# Patient Record
Sex: FEMALE | Race: WHITE | NOT HISPANIC OR LATINO | URBAN - METROPOLITAN AREA
[De-identification: names, ages, dates, MRNs, and addresses within clinical notes are randomized per-mention and may not be internally consistent; named-entity substitution may affect disease eponyms.]

---

## 2019-05-31 ENCOUNTER — APPOINTMENT (OUTPATIENT)
Dept: URGENT CARE | Age: 79
End: 2019-05-31

## 2025-04-01 RX ORDER — EZETIMIBE AND SIMVASTATIN 10; 40 MG/1; MG/1
1 TABLET ORAL EVERY EVENING
COMMUNITY

## 2025-04-01 RX ORDER — MELATONIN
1000 EVERY OTHER DAY
COMMUNITY

## 2025-04-01 RX ORDER — IBUPROFEN 200 MG
200 TABLET ORAL EVERY 6 HOURS PRN
COMMUNITY
End: 2025-04-17

## 2025-04-01 RX ORDER — ACETAMINOPHEN 500 MG
500 TABLET ORAL EVERY 6 HOURS PRN
COMMUNITY
End: 2025-04-17

## 2025-04-02 ENCOUNTER — TELEPHONE (OUTPATIENT)
Dept: FAMILY MEDICINE CLINIC | Facility: CLINIC | Age: 85
End: 2025-04-02

## 2025-04-04 ENCOUNTER — LAB ENCOUNTER (OUTPATIENT)
Dept: LAB | Facility: HOSPITAL | Age: 85
End: 2025-04-04
Attending: FAMILY MEDICINE
Payer: MEDICARE

## 2025-04-04 ENCOUNTER — OFFICE VISIT (OUTPATIENT)
Dept: FAMILY MEDICINE CLINIC | Facility: CLINIC | Age: 85
End: 2025-04-04
Payer: MEDICARE

## 2025-04-04 VITALS
BODY MASS INDEX: 26 KG/M2 | OXYGEN SATURATION: 98 % | TEMPERATURE: 98 F | DIASTOLIC BLOOD PRESSURE: 82 MMHG | RESPIRATION RATE: 16 BRPM | SYSTOLIC BLOOD PRESSURE: 142 MMHG | WEIGHT: 159 LBS | HEART RATE: 83 BPM

## 2025-04-04 DIAGNOSIS — M47.812 CERVICAL SPONDYLOSIS: ICD-10-CM

## 2025-04-04 DIAGNOSIS — I83.93 ASYMPTOMATIC VARICOSE VEINS OF BOTH LOWER EXTREMITIES: ICD-10-CM

## 2025-04-04 DIAGNOSIS — Z86.79 HISTORY OF ASCVD (ARTERIOSCLEROTIC CARDIOVASCULAR DISEASE): ICD-10-CM

## 2025-04-04 DIAGNOSIS — E53.8 VITAMIN B 12 DEFICIENCY: ICD-10-CM

## 2025-04-04 DIAGNOSIS — M47.816 LUMBAR SPONDYLOSIS: ICD-10-CM

## 2025-04-04 DIAGNOSIS — M16.11 PRIMARY OSTEOARTHRITIS OF RIGHT HIP: Primary | ICD-10-CM

## 2025-04-04 DIAGNOSIS — I10 PRIMARY HYPERTENSION: ICD-10-CM

## 2025-04-04 DIAGNOSIS — E78.2 MIXED HYPERLIPIDEMIA: ICD-10-CM

## 2025-04-04 DIAGNOSIS — E55.9 VITAMIN D DEFICIENCY: ICD-10-CM

## 2025-04-04 DIAGNOSIS — Z87.09 HISTORY OF ASTHMA: ICD-10-CM

## 2025-04-04 DIAGNOSIS — Z01.812 ENCOUNTER FOR PRE-OPERATIVE LABORATORY TESTING: ICD-10-CM

## 2025-04-04 DIAGNOSIS — M15.0 PRIMARY OSTEOARTHRITIS INVOLVING MULTIPLE JOINTS: ICD-10-CM

## 2025-04-04 LAB
ALBUMIN SERPL-MCNC: 4.3 G/DL (ref 3.2–4.8)
ALBUMIN/GLOB SERPL: 1.7 {RATIO} (ref 1–2)
ALP LIVER SERPL-CCNC: 59 U/L
ALT SERPL-CCNC: 21 U/L
ANION GAP SERPL CALC-SCNC: 6 MMOL/L (ref 0–18)
ANTIBODY SCREEN: NEGATIVE
AST SERPL-CCNC: 23 U/L (ref ?–34)
ATRIAL RATE: 58 BPM
BASOPHILS # BLD AUTO: 0.04 X10(3) UL (ref 0–0.2)
BASOPHILS NFR BLD AUTO: 0.6 %
BILIRUB SERPL-MCNC: 0.9 MG/DL (ref 0.2–1.1)
BILIRUB UR QL: NEGATIVE
BUN BLD-MCNC: 13 MG/DL (ref 9–23)
BUN/CREAT SERPL: 17.8 (ref 10–20)
CALCIUM BLD-MCNC: 9.1 MG/DL (ref 8.7–10.4)
CHLORIDE SERPL-SCNC: 99 MMOL/L (ref 98–112)
CLARITY UR: CLEAR
CO2 SERPL-SCNC: 30 MMOL/L (ref 21–32)
CREAT BLD-MCNC: 0.73 MG/DL
DEPRECATED RDW RBC AUTO: 37.8 FL (ref 35.1–46.3)
EGFRCR SERPLBLD CKD-EPI 2021: 82 ML/MIN/1.73M2 (ref 60–?)
EOSINOPHIL # BLD AUTO: 0.11 X10(3) UL (ref 0–0.7)
EOSINOPHIL NFR BLD AUTO: 1.5 %
ERYTHROCYTE [DISTWIDTH] IN BLOOD BY AUTOMATED COUNT: 11.7 % (ref 11–15)
FASTING STATUS PATIENT QL REPORTED: YES
GLOBULIN PLAS-MCNC: 2.5 G/DL (ref 2–3.5)
GLUCOSE BLD-MCNC: 98 MG/DL (ref 70–99)
GLUCOSE UR-MCNC: NORMAL MG/DL
HCT VFR BLD AUTO: 43.1 %
HGB BLD-MCNC: 15.3 G/DL
HGB UR QL STRIP.AUTO: NEGATIVE
IMM GRANULOCYTES # BLD AUTO: 0.03 X10(3) UL (ref 0–1)
IMM GRANULOCYTES NFR BLD: 0.4 %
KETONES UR-MCNC: NEGATIVE MG/DL
LEUKOCYTE ESTERASE UR QL STRIP.AUTO: 25
LYMPHOCYTES # BLD AUTO: 1.82 X10(3) UL (ref 1–4)
LYMPHOCYTES NFR BLD AUTO: 25.1 %
MCH RBC QN AUTO: 31.8 PG (ref 26–34)
MCHC RBC AUTO-ENTMCNC: 35.5 G/DL (ref 31–37)
MCV RBC AUTO: 89.6 FL
MONOCYTES # BLD AUTO: 0.55 X10(3) UL (ref 0.1–1)
MONOCYTES NFR BLD AUTO: 7.6 %
NEUTROPHILS # BLD AUTO: 4.69 X10 (3) UL (ref 1.5–7.7)
NEUTROPHILS # BLD AUTO: 4.69 X10(3) UL (ref 1.5–7.7)
NEUTROPHILS NFR BLD AUTO: 64.8 %
NITRITE UR QL STRIP.AUTO: NEGATIVE
OSMOLALITY SERPL CALC.SUM OF ELEC: 280 MOSM/KG (ref 275–295)
P AXIS: 70 DEGREES
P-R INTERVAL: 192 MS
PH UR: 7 [PH] (ref 5–8)
PLATELET # BLD AUTO: 269 10(3)UL (ref 150–450)
POTASSIUM SERPL-SCNC: 4.2 MMOL/L (ref 3.5–5.1)
PROT SERPL-MCNC: 6.8 G/DL (ref 5.7–8.2)
PROT UR-MCNC: NEGATIVE MG/DL
Q-T INTERVAL: 420 MS
QRS DURATION: 82 MS
QTC CALCULATION (BEZET): 412 MS
R AXIS: -3 DEGREES
RBC # BLD AUTO: 4.81 X10(6)UL
RH BLOOD TYPE: POSITIVE
SODIUM SERPL-SCNC: 135 MMOL/L (ref 136–145)
SP GR UR STRIP: 1.01 (ref 1–1.03)
T AXIS: 27 DEGREES
UROBILINOGEN UR STRIP-ACNC: NORMAL
VENTRICULAR RATE: 58 BPM
WBC # BLD AUTO: 7.2 X10(3) UL (ref 4–11)

## 2025-04-04 PROCEDURE — 87077 CULTURE AEROBIC IDENTIFY: CPT

## 2025-04-04 PROCEDURE — 86850 RBC ANTIBODY SCREEN: CPT | Performed by: FAMILY MEDICINE

## 2025-04-04 PROCEDURE — 99214 OFFICE O/P EST MOD 30 MIN: CPT | Performed by: FAMILY MEDICINE

## 2025-04-04 PROCEDURE — 1159F MED LIST DOCD IN RCRD: CPT | Performed by: FAMILY MEDICINE

## 2025-04-04 PROCEDURE — 93005 ELECTROCARDIOGRAM TRACING: CPT

## 2025-04-04 PROCEDURE — 1125F AMNT PAIN NOTED PAIN PRSNT: CPT | Performed by: FAMILY MEDICINE

## 2025-04-04 PROCEDURE — 36415 COLL VENOUS BLD VENIPUNCTURE: CPT

## 2025-04-04 PROCEDURE — 1160F RVW MEDS BY RX/DR IN RCRD: CPT | Performed by: FAMILY MEDICINE

## 2025-04-04 PROCEDURE — 87186 SC STD MICRODIL/AGAR DIL: CPT

## 2025-04-04 PROCEDURE — 87081 CULTURE SCREEN ONLY: CPT

## 2025-04-04 PROCEDURE — 86901 BLOOD TYPING SEROLOGIC RH(D): CPT | Performed by: FAMILY MEDICINE

## 2025-04-04 PROCEDURE — 81001 URINALYSIS AUTO W/SCOPE: CPT

## 2025-04-04 PROCEDURE — 87086 URINE CULTURE/COLONY COUNT: CPT

## 2025-04-04 PROCEDURE — 93010 ELECTROCARDIOGRAM REPORT: CPT | Performed by: INTERNAL MEDICINE

## 2025-04-04 PROCEDURE — 80053 COMPREHEN METABOLIC PANEL: CPT

## 2025-04-04 PROCEDURE — 86900 BLOOD TYPING SEROLOGIC ABO: CPT | Performed by: FAMILY MEDICINE

## 2025-04-04 PROCEDURE — 85025 COMPLETE CBC W/AUTO DIFF WBC: CPT

## 2025-04-04 NOTE — H&P
[facility] PRE-OP CLINIC [place]    PRE-OP NOTE    HPI:   I have been consulted by Dr. Muñoz to see Mary Oneill 84 year old female for a preoperative evaluation and medical clearance. She has a long history of worsening severe degenerative arthritis of her right hip . Patient is to have a right total hip arthroplasty  by Dr. Muñoz  on 4/15/2025.     Pt suffers significant pain and loss of function.     She has no cardiopulmonary symptoms.      She has no history of JORGE or DVT. Denies tobacco use.       Family History   Problem Relation Age of Onset    Diabetes Father     Obesity Father     Lipids Father     Heart Disorder Father     Cancer Mother         breast    Diabetes Sister     Diabetes Brother     Cancer Maternal Aunt       Current Outpatient Medications   Medication Sig Dispense Refill    ezetimibe-simvastatin 10-40 MG Oral Tab Take 1 tablet by mouth every evening.      Cholecalciferol (VITAMIN D-3 OR) Take 2,000 Int'l Units/day by mouth daily.      cyanocobalamin 1000 MCG Oral Tab Take 1 tablet (1,000 mcg total) by mouth every other day.      acetaminophen 500 MG Oral Tab Take 1 tablet (500 mg total) by mouth every 6 (six) hours as needed for Pain.      ibuprofen (ADVIL) 200 MG Oral Tab Take 1 tablet (200 mg total) by mouth every 6 (six) hours as needed for Pain.       Past Medical History:    Cataract    High cholesterol    Osteoarthritis    UTI (urinary tract infection)    Varicose vein of leg    bilateral    Visual impairment    glasses     Past Surgical History:   Procedure Laterality Date    Colonoscopy      Foot surgery Bilateral 2022    Buniectomy    Other surgical history      bilat leg varicose vein procedure    Total knee replacement Right 2012    Tubal ligation       Social History     Socioeconomic History    Marital status: Not on file     Spouse name: Not on file    Number of children: Not on file    Years of education: Not on file    Highest education level: Not on file   Occupational  History    Not on file   Tobacco Use    Smoking status: Never    Smokeless tobacco: Never   Vaping Use    Vaping status: Never Used   Substance and Sexual Activity    Alcohol use: Not Currently     Comment: rarely    Drug use: Never    Sexual activity: Not on file   Other Topics Concern    Not on file   Social History Narrative    Not on file     Social Drivers of Health     Food Insecurity: Not on file   Transportation Needs: Not on file   Stress: Not on file   Housing Stability: Not on file       REVIEW OF SYSTEMS:   CONSTITUTIONAL:  Denies unusual weight gain/loss, fever, chills  EENT:  Eyes:  Denies eye pain, visual loss, blurred vision, double vision. Ears, Nose, Throat:  Denies congestion, runny nose or sore throat.  INTEGUMENTARY:  Denies rashes, itching, skin lesion,   CARDIOVASCULAR:  Denies DVT. Denies chest pain, palpitations, edema, dyspnea  RESPIRATORY: Denies  JORGE ,Denies shortness of breath, wheezing, cough  GASTROINTESTINAL:  Denies abdominal pain, nausea, vomiting, constipation, diarrhea, or blood in stool.  MUSCULOSKELETAL: severe pain to her right hip as noted above  NEUROLOGICAL:  Denies headache, seizures, dizziness, syncope, paralysis, ataxia,  HEMATOLOGIC:  Denies anemia, bleeding or bruising.  LYMPHATICS:  Denies enlarged nodes   PSYCHIATRIC:  Denies depression or anxiety.  ENDOCRINOLOGIC: DM 2 NO,   ALLERGIES:  Denies allergic response, history of asthma, hives,     EXAM:   /82 (BP Location: Left arm, Patient Position: Sitting)   Pulse 83   Temp 98.2 °F (36.8 °C) (Temporal)   Resp 16   Wt 159 lb (72.1 kg)   SpO2 98%   BMI 26.46 kg/m²  Estimated body mass index is 26.46 kg/m² as calculated from the following:    Height as of 4/1/25: 5' 5\" (1.651 m).    Weight as of this encounter: 159 lb (72.1 kg).   Vital signs reviewed.Appears stated age, well groomed.  Physical Exam:  GEN:  Patient is alert, awake and oriented, well developed, well nourished, no apparent distress.  HEENT:   Head:  Normocephalic, atraumatic  Nose: patent, no nasal discharge  NECK: Supple, no CLAD, no carotid bruit, no thyromegaly.  SKIN: No rashes, no skin lesion, no bruising, good turgor.  HEART:  Regular rate and rhythm, no murmurs, rubs or gallops.  LUNGS: Clear to auscultation bilterally, no rales/rhonchi/wheezing.  CHEST: No tenderness.  ABDOMEN:  Soft, nondistended, nontender,  no masses, no hepatosplenomegaly.  BACK: No tenderness, no spasm,   EXTREMITIES:  restricted ROM to her right hip ,   NEURO:  No focal deficit, speech fluent, she walks with an antalgic  gait, strength and tone, sensory intact      No results found for: \"WBC\", \"HGB\", \"HCT\", \"PLT\", \"CREATSERUM\", \"BUN\", \"NA\", \"K\", \"CL\", \"CO2\", \"GLU\", \"CA\", \"ALB\", \"ALKPHO\", \"BILT\", \"TP\", \"AST\", \"ALT\", \"PTT\", \"INR\", \"PT\", \"T4F\", \"TSH\", \"TSHREFLEX\", \"CHAO\", \"LIP\", \"GGT\", \"PSA\", \"DDIMER\", \"ESRML\", \"ESRPF\", \"CRP\", \"BNP\", \"MG\", \"PHOS\", \"TROP\", \"CK\", \"CKMB\", \"PEPE\", \"RPR\", \"B12\", \"ETOH\", \"POCGLU\"    No results found.          ASSESSMENT AND PLAN:   Mary Oneill is a 84 year old female, with a hx of severe degenerative arthritis of her right hip  who presents for a pre-operative physical exam. Patient is to have a right total hip arthroplasty  by Dr. Muñoz  on 4/15/2025.      ICD-10-CM    1. Primary osteoarthritis of right hip  M16.11       2. Mixed hyperlipidemia  E78.2       3. Asymptomatic varicose veins of both lower extremities  I83.93       4. Primary osteoarthritis involving multiple joints S/P right total knee arthroplasty  M15.0       5. Vitamin D deficiency  E55.9       6. Vitamin B 12 deficiency  E53.8       7. Primary hypertension  I10       8. History of ASCVD (arteriosclerotic cardiovascular disease)  Z86.79       9. Cervical spondylosis  M47.812       10. Lumbar spondylosis  M47.816       11. History of asthma  Z87.09       12. Encounter for pre-operative laboratory testing  Z01.812 CBC With Differential With Platelet     Comp Metabolic Panel (14)      MSSA and MRSA Culture Screen     Type and screen     Urinalysis with Culture Reflex     EKG 12 Lead         ECG and labs are pending review     Preoperative Risk Stratification: There are no decompensated medical conditions. ASA classification 2    Medical history:  High risk surgery (vascular, thoracic, intra-peritoneal): No  CAD: No  CHF: No  Stroke: No  DM on insulin: No  Serum Creatinine >2 mg/dl: No  Patient has an RCRI score that is  low risk and is at low risk for major cardiac event in the perioperative period.     Patient is medically optimized and has an acceptable risk of surgery and may proceed with surgery as planned.     PLAN:    Patient to discontinue medications and supplements with anticoagulation properties as per instruction.        Postoperative Recommendations:    Anticoagulation / DVT prophylaxis: SCDs, early ambulation.  mg twice daily with food for four weeks.    GI protection: Protonix  Incentive Spirometry   Telemetry as needed  CPAP/O2 as needed   DM: QID glucoscans and sliding scale insulin as needed   Renal protection: (hydration / NSAID and ACE/ARB avoidance)   Cognitive protection: (minimize narcotics, benzodiazepines, scopolamine)     Pain management and Physical therapy as per Orthopedic service.   Home Health as needed    Thank you for the opportunity to care for your patient and to assist in managing the postoperative course.        Ravi Parks DO  4/4/2025  10:11 AM

## 2025-04-04 NOTE — H&P (VIEW-ONLY)
[facility] PRE-OP CLINIC [place]    PRE-OP NOTE    HPI:   I have been consulted by Dr. Muñoz to see Mary Oneill 84 year old female for a preoperative evaluation and medical clearance. She has a long history of worsening severe degenerative arthritis of her right hip . Patient is to have a right total hip arthroplasty  by Dr. Muñoz  on 4/15/2025.     Pt suffers significant pain and loss of function.     She has no cardiopulmonary symptoms.      She has no history of JORGE or DVT. Denies tobacco use.       Family History   Problem Relation Age of Onset    Diabetes Father     Obesity Father     Lipids Father     Heart Disorder Father     Cancer Mother         breast    Diabetes Sister     Diabetes Brother     Cancer Maternal Aunt       Current Outpatient Medications   Medication Sig Dispense Refill    ezetimibe-simvastatin 10-40 MG Oral Tab Take 1 tablet by mouth every evening.      Cholecalciferol (VITAMIN D-3 OR) Take 2,000 Int'l Units/day by mouth daily.      cyanocobalamin 1000 MCG Oral Tab Take 1 tablet (1,000 mcg total) by mouth every other day.      acetaminophen 500 MG Oral Tab Take 1 tablet (500 mg total) by mouth every 6 (six) hours as needed for Pain.      ibuprofen (ADVIL) 200 MG Oral Tab Take 1 tablet (200 mg total) by mouth every 6 (six) hours as needed for Pain.       Past Medical History:    Cataract    High cholesterol    Osteoarthritis    UTI (urinary tract infection)    Varicose vein of leg    bilateral    Visual impairment    glasses     Past Surgical History:   Procedure Laterality Date    Colonoscopy      Foot surgery Bilateral 2022    Buniectomy    Other surgical history      bilat leg varicose vein procedure    Total knee replacement Right 2012    Tubal ligation       Social History     Socioeconomic History    Marital status: Not on file     Spouse name: Not on file    Number of children: Not on file    Years of education: Not on file    Highest education level: Not on file   Occupational  History    Not on file   Tobacco Use    Smoking status: Never    Smokeless tobacco: Never   Vaping Use    Vaping status: Never Used   Substance and Sexual Activity    Alcohol use: Not Currently     Comment: rarely    Drug use: Never    Sexual activity: Not on file   Other Topics Concern    Not on file   Social History Narrative    Not on file     Social Drivers of Health     Food Insecurity: Not on file   Transportation Needs: Not on file   Stress: Not on file   Housing Stability: Not on file       REVIEW OF SYSTEMS:   CONSTITUTIONAL:  Denies unusual weight gain/loss, fever, chills  EENT:  Eyes:  Denies eye pain, visual loss, blurred vision, double vision. Ears, Nose, Throat:  Denies congestion, runny nose or sore throat.  INTEGUMENTARY:  Denies rashes, itching, skin lesion,   CARDIOVASCULAR:  Denies DVT. Denies chest pain, palpitations, edema, dyspnea  RESPIRATORY: Denies  JORGE ,Denies shortness of breath, wheezing, cough  GASTROINTESTINAL:  Denies abdominal pain, nausea, vomiting, constipation, diarrhea, or blood in stool.  MUSCULOSKELETAL: severe pain to her right hip as noted above  NEUROLOGICAL:  Denies headache, seizures, dizziness, syncope, paralysis, ataxia,  HEMATOLOGIC:  Denies anemia, bleeding or bruising.  LYMPHATICS:  Denies enlarged nodes   PSYCHIATRIC:  Denies depression or anxiety.  ENDOCRINOLOGIC: DM 2 NO,   ALLERGIES:  Denies allergic response, history of asthma, hives,     EXAM:   /82 (BP Location: Left arm, Patient Position: Sitting)   Pulse 83   Temp 98.2 °F (36.8 °C) (Temporal)   Resp 16   Wt 159 lb (72.1 kg)   SpO2 98%   BMI 26.46 kg/m²  Estimated body mass index is 26.46 kg/m² as calculated from the following:    Height as of 4/1/25: 5' 5\" (1.651 m).    Weight as of this encounter: 159 lb (72.1 kg).   Vital signs reviewed.Appears stated age, well groomed.  Physical Exam:  GEN:  Patient is alert, awake and oriented, well developed, well nourished, no apparent distress.  HEENT:   Head:  Normocephalic, atraumatic  Nose: patent, no nasal discharge  NECK: Supple, no CLAD, no carotid bruit, no thyromegaly.  SKIN: No rashes, no skin lesion, no bruising, good turgor.  HEART:  Regular rate and rhythm, no murmurs, rubs or gallops.  LUNGS: Clear to auscultation bilterally, no rales/rhonchi/wheezing.  CHEST: No tenderness.  ABDOMEN:  Soft, nondistended, nontender,  no masses, no hepatosplenomegaly.  BACK: No tenderness, no spasm,   EXTREMITIES:  restricted ROM to her right hip ,   NEURO:  No focal deficit, speech fluent, she walks with an antalgic  gait, strength and tone, sensory intact      No results found for: \"WBC\", \"HGB\", \"HCT\", \"PLT\", \"CREATSERUM\", \"BUN\", \"NA\", \"K\", \"CL\", \"CO2\", \"GLU\", \"CA\", \"ALB\", \"ALKPHO\", \"BILT\", \"TP\", \"AST\", \"ALT\", \"PTT\", \"INR\", \"PT\", \"T4F\", \"TSH\", \"TSHREFLEX\", \"CHAO\", \"LIP\", \"GGT\", \"PSA\", \"DDIMER\", \"ESRML\", \"ESRPF\", \"CRP\", \"BNP\", \"MG\", \"PHOS\", \"TROP\", \"CK\", \"CKMB\", \"PEPE\", \"RPR\", \"B12\", \"ETOH\", \"POCGLU\"    No results found.          ASSESSMENT AND PLAN:   Mary Oneill is a 84 year old female, with a hx of severe degenerative arthritis of her right hip  who presents for a pre-operative physical exam. Patient is to have a right total hip arthroplasty  by Dr. Muñoz  on 4/15/2025.      ICD-10-CM    1. Primary osteoarthritis of right hip  M16.11       2. Mixed hyperlipidemia  E78.2       3. Asymptomatic varicose veins of both lower extremities  I83.93       4. Primary osteoarthritis involving multiple joints S/P right total knee arthroplasty  M15.0       5. Vitamin D deficiency  E55.9       6. Vitamin B 12 deficiency  E53.8       7. Primary hypertension  I10       8. History of ASCVD (arteriosclerotic cardiovascular disease)  Z86.79       9. Cervical spondylosis  M47.812       10. Lumbar spondylosis  M47.816       11. History of asthma  Z87.09       12. Encounter for pre-operative laboratory testing  Z01.812 CBC With Differential With Platelet     Comp Metabolic Panel (14)      MSSA and MRSA Culture Screen     Type and screen     Urinalysis with Culture Reflex     EKG 12 Lead         ECG and labs are pending review     Preoperative Risk Stratification: There are no decompensated medical conditions. ASA classification 2    Medical history:  High risk surgery (vascular, thoracic, intra-peritoneal): No  CAD: No  CHF: No  Stroke: No  DM on insulin: No  Serum Creatinine >2 mg/dl: No  Patient has an RCRI score that is  low risk and is at low risk for major cardiac event in the perioperative period.     Patient is medically optimized and has an acceptable risk of surgery and may proceed with surgery as planned.     PLAN:    Patient to discontinue medications and supplements with anticoagulation properties as per instruction.        Postoperative Recommendations:    Anticoagulation / DVT prophylaxis: SCDs, early ambulation.  mg twice daily with food for four weeks.    GI protection: Protonix  Incentive Spirometry   Telemetry as needed  CPAP/O2 as needed   DM: QID glucoscans and sliding scale insulin as needed   Renal protection: (hydration / NSAID and ACE/ARB avoidance)   Cognitive protection: (minimize narcotics, benzodiazepines, scopolamine)     Pain management and Physical therapy as per Orthopedic service.   Home Health as needed    Thank you for the opportunity to care for your patient and to assist in managing the postoperative course.        Ravi Parks DO  4/4/2025  10:11 AM

## 2025-04-04 NOTE — TELEPHONE ENCOUNTER
Dr. Parks, please review:    Labs - Sodium (135), MRSA neg, UA E. Coli, all other labs WNL    EKG - Sinus indira (58bpm), otherwise WNL    Pending normal UA, pt okay for surgery?

## 2025-04-07 RX ORDER — CEPHALEXIN 500 MG/1
500 CAPSULE ORAL 3 TIMES DAILY
Qty: 15 CAPSULE | Refills: 0 | Status: ON HOLD | OUTPATIENT
Start: 2025-04-07 | End: 2025-04-15

## 2025-04-07 NOTE — TELEPHONE ENCOUNTER
Spoke to patient, went over all test results. She will start Keflex on 04/10-04/14 TID x5 days prior to surgery. Rx sent to pharmacy. Patient given the # for  [facility] to follow up for chronic UTI's.

## 2025-04-07 NOTE — TELEPHONE ENCOUNTER
Please review Urine Culture done 04/04/25    URINE CULTURE 50,000-99,000 CFU/ML Escherichia coli Abnormal         Resulting Agency: [facility] ([facility])     Susceptibility     Escherichia coli     Not Specified    Ampicillin 4 Sensitive    Cefazolin <=4 Sensitive    Ciprofloxacin >=4 Resistant    Gentamicin <=1 Sensitive    Levofloxacin >=8 Resistant    Meropenem <=0.25 Sensitive    Nitrofurantoin <=16 Sensitive    Piperacillin + Tazobactam <=4 Sensitive    Trimethoprim/Sulfa <=20 Sensitive         Please advise what antibiotic course she should take for UTI. NKDA

## 2025-04-11 NOTE — CM/SW NOTE
SW was notified the pt. Has been pre-operatively set up with [facility].    Referral to be sent via [facility] post op.    [facility] order and face to face to be sent via [facility] once entered.     ALEX Ramírez ext [phone]

## 2025-04-15 ENCOUNTER — HOSPITAL ENCOUNTER (OUTPATIENT)
Facility: HOSPITAL | Age: 85
Discharge: HOME HEALTH | End: 2025-04-17
Attending: ORTHOPAEDIC SURGERY | Admitting: ORTHOPAEDIC SURGERY
Payer: MEDICARE

## 2025-04-15 ENCOUNTER — ANESTHESIA EVENT (OUTPATIENT)
Dept: SURGERY | Facility: HOSPITAL | Age: 85
End: 2025-04-15
Payer: MEDICARE

## 2025-04-15 ENCOUNTER — APPOINTMENT (OUTPATIENT)
Dept: GENERAL RADIOLOGY | Facility: HOSPITAL | Age: 85
End: 2025-04-15
Attending: GENERAL PRACTICE
Payer: MEDICARE

## 2025-04-15 ENCOUNTER — ANESTHESIA (OUTPATIENT)
Dept: SURGERY | Facility: HOSPITAL | Age: 85
End: 2025-04-15
Payer: MEDICARE

## 2025-04-15 DIAGNOSIS — Z96.641 S/P HIP REPLACEMENT, RIGHT: Primary | ICD-10-CM

## 2025-04-15 PROBLEM — E78.5 HYPERLIPIDEMIA: Status: ACTIVE | Noted: 2025-04-15

## 2025-04-15 PROBLEM — M16.11 PRIMARY OSTEOARTHRITIS OF RIGHT HIP: Status: ACTIVE | Noted: 2025-04-15

## 2025-04-15 LAB
ALBUMIN SERPL-MCNC: 3.5 G/DL (ref 3.2–4.8)
ALBUMIN/GLOB SERPL: 2.2 {RATIO} (ref 1–2)
ALP LIVER SERPL-CCNC: 39 U/L (ref 55–142)
ALT SERPL-CCNC: 14 U/L (ref 10–49)
ANION GAP SERPL CALC-SCNC: 8 MMOL/L (ref 0–18)
AST SERPL-CCNC: 22 U/L (ref ?–34)
BASOPHILS # BLD AUTO: 0.03 X10(3) UL (ref 0–0.2)
BASOPHILS NFR BLD AUTO: 0.3 %
BILIRUB SERPL-MCNC: 0.5 MG/DL (ref 0.2–1.1)
BUN BLD-MCNC: 8 MG/DL (ref 9–23)
BUN/CREAT SERPL: 10.8 (ref 10–20)
CALCIUM BLD-MCNC: 8.6 MG/DL (ref 8.7–10.4)
CHLORIDE SERPL-SCNC: 103 MMOL/L (ref 98–112)
CO2 SERPL-SCNC: 24 MMOL/L (ref 21–32)
CREAT BLD-MCNC: 0.74 MG/DL (ref 0.55–1.02)
DEPRECATED RDW RBC AUTO: 39.8 FL (ref 35.1–46.3)
EGFRCR SERPLBLD CKD-EPI 2021: 81 ML/MIN/1.73M2 (ref 60–?)
EOSINOPHIL # BLD AUTO: 0.55 X10(3) UL (ref 0–0.7)
EOSINOPHIL NFR BLD AUTO: 5.2 %
ERYTHROCYTE [DISTWIDTH] IN BLOOD BY AUTOMATED COUNT: 12.4 % (ref 11–15)
GLOBULIN PLAS-MCNC: 1.6 G/DL (ref 2–3.5)
GLUCOSE BLD-MCNC: 129 MG/DL (ref 70–99)
HCT VFR BLD AUTO: 39.1 % (ref 35–48)
HGB BLD-MCNC: 13.6 G/DL (ref 12–16)
IMM GRANULOCYTES # BLD AUTO: 0.07 X10(3) UL (ref 0–1)
IMM GRANULOCYTES NFR BLD: 0.7 %
LYMPHOCYTES # BLD AUTO: 2.27 X10(3) UL (ref 1–4)
LYMPHOCYTES NFR BLD AUTO: 21.5 %
MCH RBC QN AUTO: 30.4 PG (ref 26–34)
MCHC RBC AUTO-ENTMCNC: 34.8 G/DL (ref 31–37)
MCV RBC AUTO: 87.3 FL (ref 80–100)
MONOCYTES # BLD AUTO: 0.59 X10(3) UL (ref 0.1–1)
MONOCYTES NFR BLD AUTO: 5.6 %
NEUTROPHILS # BLD AUTO: 7.07 X10 (3) UL (ref 1.5–7.7)
NEUTROPHILS # BLD AUTO: 7.07 X10(3) UL (ref 1.5–7.7)
NEUTROPHILS NFR BLD AUTO: 66.7 %
OSMOLALITY SERPL CALC.SUM OF ELEC: 280 MOSM/KG (ref 275–295)
PLATELET # BLD AUTO: 176 10(3)UL (ref 150–450)
POTASSIUM SERPL-SCNC: 4.1 MMOL/L (ref 3.5–5.1)
PROT SERPL-MCNC: 5.1 G/DL (ref 5.7–8.2)
RBC # BLD AUTO: 4.48 X10(6)UL (ref 3.8–5.3)
RH BLOOD TYPE: POSITIVE
SODIUM SERPL-SCNC: 135 MMOL/L (ref 136–145)
WBC # BLD AUTO: 10.6 X10(3) UL (ref 4–11)

## 2025-04-15 PROCEDURE — 72170 X-RAY EXAM OF PELVIS: CPT | Performed by: GENERAL PRACTICE

## 2025-04-15 PROCEDURE — P9045 ALBUMIN (HUMAN), 5%, 250 ML: HCPCS | Performed by: ANESTHESIOLOGY

## 2025-04-15 PROCEDURE — 0SR904Z REPLACEMENT OF RIGHT HIP JOINT WITH CERAMIC ON POLYETHYLENE SYNTHETIC SUBSTITUTE, OPEN APPROACH: ICD-10-PCS | Performed by: ORTHOPAEDIC SURGERY

## 2025-04-15 PROCEDURE — 36430 TRANSFUSION BLD/BLD COMPNT: CPT | Performed by: ANESTHESIOLOGY

## 2025-04-15 PROCEDURE — 99204 OFFICE O/P NEW MOD 45 MIN: CPT | Performed by: HOSPITALIST

## 2025-04-15 DEVICE — IMPLANTABLE DEVICE
Type: IMPLANTABLE DEVICE | Site: HIP | Status: FUNCTIONAL
Brand: G7® ACETABULAR SYSTEM

## 2025-04-15 DEVICE — IMPLANTABLE DEVICE: Type: IMPLANTABLE DEVICE | Site: HIP | Status: FUNCTIONAL

## 2025-04-15 DEVICE — BIOLOX® DELTA, FEMORAL HEAD, L, CERAMIC, Ø 40/+3.5, TAPER 12/14
Type: IMPLANTABLE DEVICE | Site: HIP | Status: FUNCTIONAL
Brand: BIOLOX® DELTA

## 2025-04-15 DEVICE — IMPLANTABLE DEVICE
Type: IMPLANTABLE DEVICE | Site: HIP | Status: FUNCTIONAL
Brand: AVENIR COMPLETE™

## 2025-04-15 DEVICE — IMPLANTABLE DEVICE
Type: IMPLANTABLE DEVICE | Site: HIP | Status: FUNCTIONAL
Brand: G7® VIVACIT-E®

## 2025-04-15 RX ORDER — MAGNESIUM HYDROXIDE 1200 MG/15ML
LIQUID ORAL CONTINUOUS PRN
Status: DISCONTINUED | OUTPATIENT
Start: 2025-04-15 | End: 2025-04-15 | Stop reason: HOSPADM

## 2025-04-15 RX ORDER — TRANEXAMIC ACID 10 MG/ML
INJECTION, SOLUTION INTRAVENOUS AS NEEDED
Status: DISCONTINUED | OUTPATIENT
Start: 2025-04-15 | End: 2025-04-15 | Stop reason: SURG

## 2025-04-15 RX ORDER — ACETAMINOPHEN 500 MG
1000 TABLET ORAL ONCE
Status: COMPLETED | OUTPATIENT
Start: 2025-04-15 | End: 2025-04-15

## 2025-04-15 RX ORDER — FAMOTIDINE 10 MG/ML
20 INJECTION, SOLUTION INTRAVENOUS 2 TIMES DAILY
Status: DISCONTINUED | OUTPATIENT
Start: 2025-04-15 | End: 2025-04-17

## 2025-04-15 RX ORDER — ONDANSETRON 2 MG/ML
4 INJECTION INTRAMUSCULAR; INTRAVENOUS EVERY 6 HOURS PRN
Status: DISCONTINUED | OUTPATIENT
Start: 2025-04-15 | End: 2025-04-17

## 2025-04-15 RX ORDER — PHENYLEPHRINE HCL 10 MG/ML
VIAL (ML) INJECTION AS NEEDED
Status: DISCONTINUED | OUTPATIENT
Start: 2025-04-15 | End: 2025-04-15 | Stop reason: SURG

## 2025-04-15 RX ORDER — HYDROMORPHONE HYDROCHLORIDE 1 MG/ML
0.2 INJECTION, SOLUTION INTRAMUSCULAR; INTRAVENOUS; SUBCUTANEOUS EVERY 5 MIN PRN
Status: DISCONTINUED | OUTPATIENT
Start: 2025-04-15 | End: 2025-04-15 | Stop reason: HOSPADM

## 2025-04-15 RX ORDER — POLYETHYLENE GLYCOL 3350 17 G/17G
17 POWDER, FOR SOLUTION ORAL DAILY PRN
Status: DISCONTINUED | OUTPATIENT
Start: 2025-04-15 | End: 2025-04-17

## 2025-04-15 RX ORDER — MORPHINE SULFATE 10 MG/ML
6 INJECTION, SOLUTION INTRAMUSCULAR; INTRAVENOUS EVERY 10 MIN PRN
Status: DISCONTINUED | OUTPATIENT
Start: 2025-04-15 | End: 2025-04-15 | Stop reason: HOSPADM

## 2025-04-15 RX ORDER — BISACODYL 10 MG
10 SUPPOSITORY, RECTAL RECTAL
Status: DISCONTINUED | OUTPATIENT
Start: 2025-04-15 | End: 2025-04-17

## 2025-04-15 RX ORDER — SODIUM CHLORIDE, SODIUM LACTATE, POTASSIUM CHLORIDE, CALCIUM CHLORIDE 600; 310; 30; 20 MG/100ML; MG/100ML; MG/100ML; MG/100ML
INJECTION, SOLUTION INTRAVENOUS CONTINUOUS
Status: DISCONTINUED | OUTPATIENT
Start: 2025-04-15 | End: 2025-04-17

## 2025-04-15 RX ORDER — MIDAZOLAM HYDROCHLORIDE 1 MG/ML
INJECTION INTRAMUSCULAR; INTRAVENOUS AS NEEDED
Status: DISCONTINUED | OUTPATIENT
Start: 2025-04-15 | End: 2025-04-15 | Stop reason: SURG

## 2025-04-15 RX ORDER — LABETALOL HYDROCHLORIDE 5 MG/ML
5 INJECTION, SOLUTION INTRAVENOUS EVERY 5 MIN PRN
Status: DISCONTINUED | OUTPATIENT
Start: 2025-04-15 | End: 2025-04-15 | Stop reason: HOSPADM

## 2025-04-15 RX ORDER — FAMOTIDINE 20 MG/1
20 TABLET, FILM COATED ORAL 2 TIMES DAILY
Status: DISCONTINUED | OUTPATIENT
Start: 2025-04-15 | End: 2025-04-17

## 2025-04-15 RX ORDER — ALBUMIN HUMAN 50 G/1000ML
SOLUTION INTRAVENOUS CONTINUOUS PRN
Status: DISCONTINUED | OUTPATIENT
Start: 2025-04-15 | End: 2025-04-15 | Stop reason: SURG

## 2025-04-15 RX ORDER — TRAMADOL HYDROCHLORIDE 50 MG/1
50 TABLET ORAL EVERY 6 HOURS SCHEDULED
Status: DISCONTINUED | OUTPATIENT
Start: 2025-04-15 | End: 2025-04-16

## 2025-04-15 RX ORDER — NALOXONE HYDROCHLORIDE 0.4 MG/ML
0.08 INJECTION, SOLUTION INTRAMUSCULAR; INTRAVENOUS; SUBCUTANEOUS AS NEEDED
Status: DISCONTINUED | OUTPATIENT
Start: 2025-04-15 | End: 2025-04-15 | Stop reason: HOSPADM

## 2025-04-15 RX ORDER — CALCIUM CHLORIDE 100 MG/ML
INJECTION INTRAVENOUS; INTRAVENTRICULAR AS NEEDED
Status: DISCONTINUED | OUTPATIENT
Start: 2025-04-15 | End: 2025-04-15 | Stop reason: SURG

## 2025-04-15 RX ORDER — MORPHINE SULFATE 4 MG/ML
2 INJECTION, SOLUTION INTRAMUSCULAR; INTRAVENOUS EVERY 10 MIN PRN
Status: DISCONTINUED | OUTPATIENT
Start: 2025-04-15 | End: 2025-04-15 | Stop reason: HOSPADM

## 2025-04-15 RX ORDER — CELECOXIB 200 MG/1
200 CAPSULE ORAL 2 TIMES DAILY
Status: DISCONTINUED | OUTPATIENT
Start: 2025-04-15 | End: 2025-04-16

## 2025-04-15 RX ORDER — OXYCODONE HCL 10 MG/1
10 TABLET, FILM COATED, EXTENDED RELEASE ORAL ONCE
Status: COMPLETED | OUTPATIENT
Start: 2025-04-15 | End: 2025-04-15

## 2025-04-15 RX ORDER — EPHEDRINE SULFATE 50 MG/ML
INJECTION, SOLUTION INTRAVENOUS AS NEEDED
Status: DISCONTINUED | OUTPATIENT
Start: 2025-04-15 | End: 2025-04-15 | Stop reason: SURG

## 2025-04-15 RX ORDER — DIPHENHYDRAMINE HCL 25 MG
25 CAPSULE ORAL EVERY 4 HOURS PRN
Status: DISCONTINUED | OUTPATIENT
Start: 2025-04-15 | End: 2025-04-17

## 2025-04-15 RX ORDER — SODIUM CHLORIDE, SODIUM LACTATE, POTASSIUM CHLORIDE, CALCIUM CHLORIDE 600; 310; 30; 20 MG/100ML; MG/100ML; MG/100ML; MG/100ML
INJECTION, SOLUTION INTRAVENOUS CONTINUOUS
Status: DISCONTINUED | OUTPATIENT
Start: 2025-04-15 | End: 2025-04-15 | Stop reason: HOSPADM

## 2025-04-15 RX ORDER — GLYCOPYRROLATE 0.2 MG/ML
INJECTION, SOLUTION INTRAMUSCULAR; INTRAVENOUS AS NEEDED
Status: DISCONTINUED | OUTPATIENT
Start: 2025-04-15 | End: 2025-04-15 | Stop reason: SURG

## 2025-04-15 RX ORDER — ONDANSETRON 2 MG/ML
4 INJECTION INTRAMUSCULAR; INTRAVENOUS EVERY 6 HOURS PRN
Status: DISCONTINUED | OUTPATIENT
Start: 2025-04-15 | End: 2025-04-15 | Stop reason: HOSPADM

## 2025-04-15 RX ORDER — CELECOXIB 200 MG/1
200 CAPSULE ORAL ONCE
Status: COMPLETED | OUTPATIENT
Start: 2025-04-15 | End: 2025-04-15

## 2025-04-15 RX ORDER — SENNOSIDES 8.6 MG
17.2 TABLET ORAL NIGHTLY
Status: DISCONTINUED | OUTPATIENT
Start: 2025-04-15 | End: 2025-04-17

## 2025-04-15 RX ORDER — DOCUSATE SODIUM 100 MG/1
100 CAPSULE, LIQUID FILLED ORAL 2 TIMES DAILY
Status: DISCONTINUED | OUTPATIENT
Start: 2025-04-15 | End: 2025-04-17

## 2025-04-15 RX ORDER — ACETAMINOPHEN 500 MG
1000 TABLET ORAL EVERY 6 HOURS
Status: DISCONTINUED | OUTPATIENT
Start: 2025-04-15 | End: 2025-04-17

## 2025-04-15 RX ORDER — OXYCODONE HYDROCHLORIDE 5 MG/1
5 TABLET ORAL EVERY 4 HOURS PRN
Status: DISCONTINUED | OUTPATIENT
Start: 2025-04-15 | End: 2025-04-16

## 2025-04-15 RX ORDER — HYDRALAZINE HYDROCHLORIDE 20 MG/ML
5 INJECTION INTRAMUSCULAR; INTRAVENOUS AS NEEDED
Status: DISCONTINUED | OUTPATIENT
Start: 2025-04-15 | End: 2025-04-17

## 2025-04-15 RX ORDER — LIDOCAINE HYDROCHLORIDE 10 MG/ML
INJECTION, SOLUTION INFILTRATION; PERINEURAL
Status: COMPLETED | OUTPATIENT
Start: 2025-04-15 | End: 2025-04-15

## 2025-04-15 RX ORDER — SODIUM PHOSPHATE, DIBASIC AND SODIUM PHOSPHATE, MONOBASIC 7; 19 G/230ML; G/230ML
1 ENEMA RECTAL ONCE AS NEEDED
Status: DISCONTINUED | OUTPATIENT
Start: 2025-04-15 | End: 2025-04-17

## 2025-04-15 RX ORDER — HYDROMORPHONE HYDROCHLORIDE 1 MG/ML
0.4 INJECTION, SOLUTION INTRAMUSCULAR; INTRAVENOUS; SUBCUTANEOUS EVERY 5 MIN PRN
Status: DISCONTINUED | OUTPATIENT
Start: 2025-04-15 | End: 2025-04-15 | Stop reason: HOSPADM

## 2025-04-15 RX ORDER — METOCLOPRAMIDE HYDROCHLORIDE 5 MG/ML
10 INJECTION INTRAMUSCULAR; INTRAVENOUS EVERY 8 HOURS PRN
Status: DISCONTINUED | OUTPATIENT
Start: 2025-04-15 | End: 2025-04-17

## 2025-04-15 RX ORDER — DIPHENHYDRAMINE HYDROCHLORIDE 50 MG/ML
25 INJECTION, SOLUTION INTRAMUSCULAR; INTRAVENOUS ONCE AS NEEDED
Status: ACTIVE | OUTPATIENT
Start: 2025-04-15 | End: 2025-04-15

## 2025-04-15 RX ORDER — BUPIVACAINE HYDROCHLORIDE 5 MG/ML
INJECTION, SOLUTION EPIDURAL; INTRACAUDAL; PERINEURAL
Status: COMPLETED | OUTPATIENT
Start: 2025-04-15 | End: 2025-04-15

## 2025-04-15 RX ORDER — OXYCODONE HYDROCHLORIDE 5 MG/1
5 TABLET ORAL EVERY 4 HOURS PRN
Status: DISCONTINUED | OUTPATIENT
Start: 2025-04-15 | End: 2025-04-17

## 2025-04-15 RX ORDER — DIPHENHYDRAMINE HYDROCHLORIDE 50 MG/ML
12.5 INJECTION, SOLUTION INTRAMUSCULAR; INTRAVENOUS EVERY 4 HOURS PRN
Status: DISCONTINUED | OUTPATIENT
Start: 2025-04-15 | End: 2025-04-17

## 2025-04-15 RX ORDER — SENNOSIDES 8.6 MG
325 CAPSULE ORAL 2 TIMES DAILY
Status: DISCONTINUED | OUTPATIENT
Start: 2025-04-15 | End: 2025-04-17

## 2025-04-15 RX ORDER — ALBUTEROL SULFATE 0.83 MG/ML
2.5 SOLUTION RESPIRATORY (INHALATION) AS NEEDED
Status: DISCONTINUED | OUTPATIENT
Start: 2025-04-15 | End: 2025-04-15 | Stop reason: HOSPADM

## 2025-04-15 RX ORDER — MORPHINE SULFATE 4 MG/ML
4 INJECTION, SOLUTION INTRAMUSCULAR; INTRAVENOUS EVERY 10 MIN PRN
Status: DISCONTINUED | OUTPATIENT
Start: 2025-04-15 | End: 2025-04-15 | Stop reason: HOSPADM

## 2025-04-15 RX ORDER — HYDROMORPHONE HYDROCHLORIDE 1 MG/ML
0.6 INJECTION, SOLUTION INTRAMUSCULAR; INTRAVENOUS; SUBCUTANEOUS EVERY 5 MIN PRN
Status: DISCONTINUED | OUTPATIENT
Start: 2025-04-15 | End: 2025-04-15 | Stop reason: HOSPADM

## 2025-04-15 RX ADMIN — PHENYLEPHRINE HCL 100 MCG: 10 MG/ML VIAL (ML) INJECTION at 09:10:00

## 2025-04-15 RX ADMIN — PHENYLEPHRINE HCL 100 MCG: 10 MG/ML VIAL (ML) INJECTION at 09:24:00

## 2025-04-15 RX ADMIN — GLYCOPYRROLATE 0.1 MG: 0.2 INJECTION, SOLUTION INTRAMUSCULAR; INTRAVENOUS at 08:07:00

## 2025-04-15 RX ADMIN — SODIUM CHLORIDE, SODIUM LACTATE, POTASSIUM CHLORIDE, CALCIUM CHLORIDE: 600; 310; 30; 20 INJECTION, SOLUTION INTRAVENOUS at 08:02:00

## 2025-04-15 RX ADMIN — EPHEDRINE SULFATE 5 MG: 50 INJECTION, SOLUTION INTRAVENOUS at 07:42:00

## 2025-04-15 RX ADMIN — BUPIVACAINE HYDROCHLORIDE 3 ML: 5 INJECTION, SOLUTION EPIDURAL; INTRACAUDAL; PERINEURAL at 07:21:00

## 2025-04-15 RX ADMIN — CALCIUM CHLORIDE 0.3 G: 100 INJECTION INTRAVENOUS; INTRAVENTRICULAR at 09:20:00

## 2025-04-15 RX ADMIN — TRANEXAMIC ACID 1000 MG: 10 INJECTION, SOLUTION INTRAVENOUS at 09:42:00

## 2025-04-15 RX ADMIN — EPHEDRINE SULFATE 5 MG: 50 INJECTION, SOLUTION INTRAVENOUS at 09:13:00

## 2025-04-15 RX ADMIN — GLYCOPYRROLATE 0.2 MG: 0.2 INJECTION, SOLUTION INTRAMUSCULAR; INTRAVENOUS at 07:31:00

## 2025-04-15 RX ADMIN — CALCIUM CHLORIDE 0.3 G: 100 INJECTION INTRAVENOUS; INTRAVENTRICULAR at 09:39:00

## 2025-04-15 RX ADMIN — PHENYLEPHRINE HCL 150 MCG: 10 MG/ML VIAL (ML) INJECTION at 09:16:00

## 2025-04-15 RX ADMIN — TRANEXAMIC ACID 1000 MG: 10 INJECTION, SOLUTION INTRAVENOUS at 07:39:00

## 2025-04-15 RX ADMIN — EPHEDRINE SULFATE 7.5 MG: 50 INJECTION, SOLUTION INTRAVENOUS at 08:02:00

## 2025-04-15 RX ADMIN — SODIUM CHLORIDE, SODIUM LACTATE, POTASSIUM CHLORIDE, CALCIUM CHLORIDE: 600; 310; 30; 20 INJECTION, SOLUTION INTRAVENOUS at 09:05:00

## 2025-04-15 RX ADMIN — SODIUM CHLORIDE, SODIUM LACTATE, POTASSIUM CHLORIDE, CALCIUM CHLORIDE: 600; 310; 30; 20 INJECTION, SOLUTION INTRAVENOUS at 07:19:00

## 2025-04-15 RX ADMIN — EPHEDRINE SULFATE 5 MG: 50 INJECTION, SOLUTION INTRAVENOUS at 07:30:00

## 2025-04-15 RX ADMIN — ALBUMIN HUMAN: 50 SOLUTION INTRAVENOUS at 08:25:00

## 2025-04-15 RX ADMIN — EPHEDRINE SULFATE 5 MG: 50 INJECTION, SOLUTION INTRAVENOUS at 07:37:00

## 2025-04-15 RX ADMIN — PHENYLEPHRINE HCL 100 MCG: 10 MG/ML VIAL (ML) INJECTION at 08:27:00

## 2025-04-15 RX ADMIN — SODIUM CHLORIDE, SODIUM LACTATE, POTASSIUM CHLORIDE, CALCIUM CHLORIDE: 600; 310; 30; 20 INJECTION, SOLUTION INTRAVENOUS at 08:38:00

## 2025-04-15 RX ADMIN — LIDOCAINE HYDROCHLORIDE 3 ML: 10 INJECTION, SOLUTION INFILTRATION; PERINEURAL at 07:21:00

## 2025-04-15 RX ADMIN — EPHEDRINE SULFATE 5 MG: 50 INJECTION, SOLUTION INTRAVENOUS at 08:17:00

## 2025-04-15 RX ADMIN — PHENYLEPHRINE HCL 100 MCG: 10 MG/ML VIAL (ML) INJECTION at 09:05:00

## 2025-04-15 RX ADMIN — PHENYLEPHRINE HCL 100 MCG: 10 MG/ML VIAL (ML) INJECTION at 08:57:00

## 2025-04-15 RX ADMIN — ALBUMIN HUMAN: 50 SOLUTION INTRAVENOUS at 09:06:00

## 2025-04-15 RX ADMIN — SODIUM CHLORIDE, SODIUM LACTATE, POTASSIUM CHLORIDE, CALCIUM CHLORIDE: 600; 310; 30; 20 INJECTION, SOLUTION INTRAVENOUS at 07:55:00

## 2025-04-15 RX ADMIN — EPHEDRINE SULFATE 5 MG: 50 INJECTION, SOLUTION INTRAVENOUS at 09:24:00

## 2025-04-15 RX ADMIN — MIDAZOLAM HYDROCHLORIDE 1 MG: 1 INJECTION INTRAMUSCULAR; INTRAVENOUS at 07:20:00

## 2025-04-15 RX ADMIN — PHENYLEPHRINE HCL 100 MCG: 10 MG/ML VIAL (ML) INJECTION at 09:22:00

## 2025-04-15 NOTE — ANESTHESIA PROCEDURE NOTES
Spinal Block    Date/Time: 4/15/2025 7:21 AM    Performed by: Suri Dupont MD  Authorized by: Suri Dupont MD      General Information and Staff    Start Time:  4/15/2025 7:21 AM  End Time:  4/15/2025 7:24 AM  Anesthesiologist:  Suri Dupont MD  Performed by:  Anesthesiologist  Patient Location:  OR  Site identification: surface landmarks    Reason for Block: at surgeon's request and surgical anesthesia    Preanesthetic Checklist: patient identified, IV checked, risks and benefits discussed, monitors and equipment checked, pre-op evaluation, timeout performed, anesthesia consent and sterile technique used      Procedure Details    Patient Position:  Sitting  Prep: ChloraPrep    Monitoring:  Continuous pulse ox and heart rate  Approach:  Right paramedian  Location:  L3-4  Injection Technique:  Single-shot    Needle    Needle Type:  Quincke  Needle Gauge:  25 G  Needle Length:  3.5 in    Assessment    Sensory Level:  T8  Events: clear CSF, CSF aspirated, well tolerated and blood negative      Additional Comments     Patient positioned in sitting position.  Procedural timeout performed.  Skin prepped and draped in strictly sterile fashion.  Spinal placed at the level of L3-L4. Positive return of CSF. Negative heme.  Pt tolerated the procedure well.

## 2025-04-15 NOTE — CONSULTS
[facility]    PATIENT'S NAME: CYNTHIA ESCALANTE   ATTENDING PHYSICIAN: Randall Muñoz MD   CONSULTING PHYSICIAN: Larissa Wade MD   PATIENT ACCOUNT#:   [id number]    LOCATION:  [facility] PACU 5 [facility] 10  MEDICAL RECORD #:   [id number]       YOB: 1940  ADMISSION DATE:       04/15/2025      CONSULT DATE:  04/15/2025    REPORT OF CONSULTATION      REASON FOR ADMISSION:  Post right total hip arthroplasty.    HISTORY OF PRESENT ILLNESS:  The patient is an 84-year-old  female with chronic right hip pain and underlying severe primary osteoarthritis, failed outpatient conservative medical management options, scheduled today for above-mentioned procedure by her orthopedic surgeon, Dr. Muñoz.  Preoperatively had spinal block.  Postoperatively transferred to PACU for further monitoring.    PAST MEDICAL HISTORY:  Generalized osteoarthritis and hyperlipidemia.    PAST SURGICAL HISTORY:  Bilateral bunionectomy, right total knee arthroplasty, and tubal ligation.    MEDICATIONS:  Please see medication reconciliation list.     ALLERGIES:  No known drug allergies.    SOCIAL HISTORY:  No tobacco, alcohol, or drug use.    FAMILY HISTORY:  Father had diabetes mellitus type 2 and coronary artery disease.  Mother had breast cancer    REVIEW OF SYSTEMS:  Currently resting in bed.  No right hip pain.  No chest pain.  No shortness of breath.  Other 12-point review of systems negative.      PHYSICAL EXAMINATION:    GENERAL:  Alert, oriented to time, place, and person, no acute distress.   VITAL SIGNS:  Temperature 97.4.  Pulse 62.  Respiratory rate 10.  Blood pressure 101/65.  Pulse ox 100% on room air.  HEENT:  Atraumatic.  Oropharynx clear.  Moist mucous membranes.  Ears, nose normal.  Eyes:  Anicteric sclerae.  NECK:  Supple.  No lymphadenopathy.  Trachea midline.  Full range of motion.  LUNGS:  Clear to auscultation bilaterally.  Normal respiratory effort.  HEART:  Regular rate and rhythm.  S1 and  S2 auscultated.  No murmur.  ABDOMEN:  Soft, nondistended.  No tenderness.  Positive bowel sounds.  EXTREMITIES:  Right hip dressing.  No leg edema.  No clubbing or cyanosis.  NEUROLOGIC:  Decreased sensation and muscle movement both lower extremities post spinal block.  No other focal findings.    ASSESSMENT AND PLAN:    1.   Right hip primary osteoarthritis status post right total hip arthroplasty, spinal block.  Pain control.  Neurovascular checks.  Aspirin for DVT prophylaxis.  Physical and occupational therapy.  2.   Hyperlipidemia.  Continue home medications.    Dictated By Larissa Wade MD  d: 04/15/2025 11:01:33  t: 04/15/2025 13:30:29  Job [id number]  FB/

## 2025-04-15 NOTE — INTERVAL H&P NOTE
Pre-op Diagnosis: Degenerative joint disease right hip    The above referenced H&P was reviewed by Ramakrishna Catalan MD on 4/15/2025, the patient was examined and no significant changes have occurred in the patient's condition since the H&P was performed.  I discussed with the patient and/or legal representative the potential benefits, risks and side effects of this procedure; the likelihood of the patient achieving goals; and potential problems that might occur during recuperation.  I discussed reasonable alternatives to the procedure, including risks, benefits and side effects related to the alternatives and risks related to not receiving this procedure.  We will proceed with procedure as planned.

## 2025-04-15 NOTE — OPERATIVE REPORT
OPERATIVE REPORT     PROCEDURE DATE: 4/15/2025     SURGEON: Randall Muñoz MD      ASSISTANT SURGEON: Ramakrishna Catalan MD  (No qualified resident was available to assist with this case; we will thus bill for fellow)    ASSISTANT: Marybeth Miller, surgical assist.     PREOPERATIVE DIAGNOSIS: Degenerative joint disease right hip     POSTOPERATIVE DIAGNOSIS: Same    PROCEDURE: right total hip arthoplasty    ANESTHESIA: Spinal    PREOPERATIVE ANTIBIOTICS: Ancef 2 g IV within 60 minutes of procedure start.     ESTIMATED BLOOD LOSS: 1700 mL.    ESTIMATED IV FLUIDS: see anesthesia record    ESTIMATED URINE OUTPUT: no dickens     IMPLANTS   1. Sabrina G7 acetabular shell, multihole, 54 mm outer diameter, size F.   2. 6.5 mm bone screws x 2  3. Sabrina G7 highly cross-linked polyethylene liner; neutral, 40 mm inner diameter, size F.   4. Sabrina Avenir Complete collared femoral stem, [place], , size 3.   5. Sabrina Biolox Delta ceramic femoral head, 40 mm, +3.5, 12-14 taper.     SPECIMENS  Femoral head to pathology    COMPLICATIONS   None apparent.     FINDINGS   Consistent with end-stage hip degenerative joint disease.     INDICATIONS   Mary Oneill is a 84 year old female who has been followed by the orthopedic surgery service for right hip degenerative joint disease. Mary Oneill was previously seen and evaluated in the orthopedic clinic and diagnosed with hip degenerative joint disease. After nonoperative treatment measures such as physical therapy, activity modification, weight loss, and NSAIDs failed to improved the patient's symptoms, Mary Oneill wished to proceed with surgery and was therefore scheduled for the above-described procedure on an elective basis.     TECHNIQUE   Mary Oneill was identified and greeted in the preoperative holding area and was identified using medical record number, name, and date of birth, all of which were confirmed. The operative hip was marked using an indelible marker and  informed consent was verbally confirmed. The patient had previously signed a consent in clinic. Once again, all risks and benefits as well as alternatives to surgical intervention were discussed with the patient in detail and all the questions were answered. Risks discussed included but were not limited to pain, infection, bleeding, scarring, stiffness, thromboembolic events, severe limb dysfunction, loss of limb, and loss of life. The patient verbally confirmed the consent and wished to proceed with surgery as scheduled.     The anesthesia service then proceeded with anesthesia and Mary Oneill was taken to the operating room and placed on the operating table in the lateral decubitus position. Care was taken to pad all bony prominences well. The patient was locked in the lateral decubitus position using the standard positioners. The lower extremity was then prepped and draped in a standard fashion. A preprocedural timeout was then performed once again confirming the patient's correct identity, correct procedure, correct side, and correct site. In addition, allergy status and required equipment were reviewed. Three independent members of the operating room team agreed on the above-mentioned parameters.     The ASIS was identified and approximately 2-3 cm posterior to it on the illiac crest a percutaneous stab incision was made and a fide pin was placed.  A second incision was also made and a second fide pin was placed.  The hip navigation Hands platform was attached to the two pins.  Next, A standard posterior approach to the operative hip was then performed in the usual fashion. Once the gluteus rhonda fascia was identified, it was split in line with its fibers under careful hemostasis using a Bovie. The tip of the trochanter was then identified and a standard arthrotomy was performed, traveling from quadratus femoris in line with the gluteus medius fibers proximally. The posterior hip capsule  was tacked using #2 Ethibond suture.  Prior to hip dislocation, the intellijoint button was secured to the greater trochanter with 1 screw and the center of hip rotation was determined from the intellijoint system.  Next,  the hip was dislocated without complications. The saddle point of the femur and the lesser trochanter were directly palpated and visualized and freed of any interposed tissues. A standard neck cut was then performed according to preoperative templating using an oscillating saw and an osteotome.     The femur was then placed anteriorly to the acetabulum and retracted using an anterior retractor. A second retractor was placed on the posterior inferior aspect of the acetabulum. Next, any remaining labral remnants and scar tissue were carefully removed circumferentially from the acetabulum. The cotyloid fossa was cleared out of any soft tissues under careful hemostasis using a Bovie. Sequential reaming was then performed. Trialing was then performed which was found to fit the patient's anatomy well and abduction, anteversion and offset were all confirmed with the intellijoint system. Accordingly, an appropriately sized Sabrina G7 acetabular component was then placed and impacted into place using gentle mallet blows. It was secured in place using two 6.5 mm bone screws. A poly trial was then placed.     Attention was then turned to the femur and box osteotome was used to lateralize and a canal finder was used to obtain the adequate trajectory. Sequential broaching was then performed using Sabrina Avenir broaches. Trialing was then performed and the components were found to achieve excellent stability. Leg length was found to be equal. Accordingly, all trials and the femoral broach were removed.     The definitive Sabrina G7 liner was placed and impacted using gentle mallet blows. Once its adequate position was confirmed, attention was turned back to the femur and the above documented Sabrina Avenir  complete was impacted into the proximal femur. Care was taken to avoid any calcar fractures. Trialing was once again commenced and excellent stability was achieved. Accordingly, the head was exchanged for the Biolox ceramic head specifically documented above. The hip was once again reduced and final time taken through range of motion with adequate stability and offset, anteversion and abduction again confirmed using the [employer] navigation system.  The two steimann pinns as well as the trochanter button and screw were all removed and accounted for prior to closure. These incisions were irrigated and closed with 2-0 monocryl and dermabond.     The hip was then irrigated, and the posterior capsule was carefully closed using previously placed tag sutures. It was sutured into the gluteus medius tendon and its insertion at the greater trochanter. A tight capsular repair was achieved. The hip was then once again irrigated using normal saline and closed in a layered fashion. The gluteus rhonda fascia was closed using #2 Ethibond at the corners and #2 quil, and a local anesthetic cocktail was injected into the fascia and subcutaneous tissue circumferentially abigail-incisionally. Subcutaneous closure was achieved using #0 Vicryl, 2-0 Vicryl, and the skin was closed using staples. Skin was dressed using an Silverlon dressing.     The patient was then rolled back into the supine position, transferred onto a regular bed and brought to PACU in stable condition.At the end of the procedure, all sponge, needle, instrument, and scalpel counts were performed and found to be correct.     Attending physician Dr. Muñoz was scrubbed and present for all key parts of the procedure. He supervised the entire procedure.       POSTOPERATIVE PLAN  -admit to ICU due to blood loss   -pain control  -DVT ppx: mechanical + 325mg PO aspirin BID  -Weight bearing as tolerated on the right lower extremity, posterior hip precautions  -PT on POD  #0  -Medicine co-management        Ramakrishna Catalan MD  Fellow - Adult Reconstruction  Department of Orthopaedic Surgery  4/15/2025  10:04 AM

## 2025-04-15 NOTE — ANESTHESIA PROCEDURE NOTES
Arterial Line    Date/Time: 4/15/2025 9:25 AM    Performed by: Suri Dupont MD  Authorized by: Suri Dupont MD    General Information and Staff    Procedure Start:  4/15/2025 9:25 AM  Procedure End:  4/15/2025 9:30 AM  Anesthesiologist:  Suri Dupont MD  Performed By:  Anesthesiologist  Patient Location:  OR  Indication: continuous blood pressure monitoring and blood sampling needed    Site Identification: surface landmarks    Preanesthetic Checklist: 2 patient identifiers, IV checked, risks and benefits discussed, monitors and equipment checked, pre-op evaluation, timeout performed, anesthesia consent and sterile technique used    Procedure Details    Catheter Size:  20 G  Catheter Length:  1 and 3/4 inch  Catheter Type:  Arrow  Seldinger Technique?: Yes    Laterality:  Left  Site:  Radial artery  Site Prep: chlorhexidine    Line Secured:  Wrist Brace, tape and Tegaderm    Assessment    Events: patient tolerated procedure well with no complications      Medications  4/15/2025 9:25 AM      Additional Comments

## 2025-04-15 NOTE — CM/SW NOTE
Department  notified of request for HHC, aidin referrals started. Assigned CM/SW to follow up with pt/family on further discharge planning.     Dinorah Davis, DSC

## 2025-04-15 NOTE — ANESTHESIA POSTPROCEDURE EVALUATION
Patient: Mary Oneill    Procedure Summary       Date: 04/15/25 Room / Location: [facility] MAIN OR  / [facility] MAIN OR    Anesthesia Start: 0716 Anesthesia Stop: 1015    Procedure: Right total hip arthroplasty (Right: Hip) Diagnosis: (Degenerative joint disease right hip)    Surgeons: Randall Muñoz MD Anesthesiologist: Suri Dupont MD    Anesthesia Type: spinal ASA Status: 3            Anesthesia Type: spinal    Vitals Value Taken Time   /59 04/15/25 10:15   Temp 97 04/15/25 10:15   Pulse 74 04/15/25 10:15   Resp 14 04/15/25 10:15   SpO2 100 % 04/15/25 10:15   Vitals shown include unfiled device data.    [facility] AN Post Evaluation:   Patient Evaluated in PACU  Patient Participation: complete - patient participated  Level of Consciousness: awake and alert  Pain Score: 0  Pain Management: satisfactory to patient  Airway Patency:patent  Yes    Nausea/Vomiting: none  Cardiovascular Status: acceptable and blood pressure returned to baseline  Respiratory Status: acceptable  Postoperative Hydration euvolemic  Comments: Ms. Oneill is awake and talking following commands.  SAB is receeding.  Given the intraoperative hypotension and fluid shifts secondary to acute surgical blood loss (approximately 1700 mL), decision held with surgical team, disposition is for pt to transfer to ICU when PACU criteria is met.       Suri Dupont MD  4/15/2025 10:15 AM

## 2025-04-15 NOTE — ANESTHESIA PREPROCEDURE EVALUATION
Anesthesia PreOp Note    HPI:     Mary Oneill is a 84 year old female who presents for preoperative consultation requested by: Randall Muñoz MD    Date of Surgery: 4/15/2025    Procedure(s):  Right total hip arthroplasty  Indication: Degenerative joint disease right hip    Relevant Problems   No relevant active problems       NPO:  Last Liquid Consumption Date: 04/14/25  Last Liquid Consumption Time: 1900  Last Solid Consumption Date: 04/14/25  Last Solid Consumption Time: 1800  Last Liquid Consumption Date: 04/14/25          History Review:  There are no active problems to display for this patient.      Past Medical History[1]    Past Surgical History[2]    Prescriptions Prior to Admission[3]  Current Medications and Prescriptions Ordered in Epic[4]    Allergies[5]    Family History[6]  Social Hx on file[7]    Available pre-op labs reviewed.  Lab Results   Component Value Date    WBC 7.2 04/04/2025    RBC 4.81 04/04/2025    HGB 15.3 04/04/2025    HCT 43.1 04/04/2025    MCV 89.6 04/04/2025    MCH 31.8 04/04/2025    MCHC 35.5 04/04/2025    RDW 11.7 04/04/2025    .0 04/04/2025     Lab Results   Component Value Date     (L) 04/04/2025    K 4.2 04/04/2025    CL 99 04/04/2025    CO2 30.0 04/04/2025    BUN 13 04/04/2025    CREATSERUM 0.73 04/04/2025    GLU 98 04/04/2025    CA 9.1 04/04/2025          Vital Signs:  Body mass index is 25.79 kg/m².   height is 1.651 m (5' 5\") and weight is 70.3 kg (155 lb). Her oral temperature is 97.6 °F (36.4 °C). Her blood pressure is 172/87 (abnormal) and her pulse is 71. Her respiration is 20 and oxygen saturation is 98%.   Vitals:    04/01/25 1742 04/15/25 0605   BP:  (!) 172/87   Pulse:  71   Resp:  20   Temp:  97.6 °F (36.4 °C)   TempSrc:  Oral   SpO2:  98%   Weight: 71.2 kg (157 lb) 70.3 kg (155 lb)   Height: 1.651 m (5' 5\") 1.651 m (5' 5\")        Anesthesia Evaluation     Patient summary reviewed and Nursing notes reviewed    No history of anesthetic  complications   Airway   Mallampati: II  TM distance: >3 FB  Neck ROM: full  Dental - Dentition appears grossly intact     Comment: Pt denies any loose or missing teeth.     Pulmonary - normal exam    breath sounds clear to auscultation  (+) asthma  Cardiovascular - normal exam  Exercise tolerance: good  (-) angina, SAEED    ECG reviewed  Rhythm: regular  Rate: normal  ROS comment: Pt denies chest pain, SOB or SOB with exertion.    EKG  Sinus bradycardia  Otherwise normal ECG  No previous ECGs found in Muse  Confirmed by KORI NELSON CASH ([id number]) on 4/4/2025 12:44:13 PM    Specimen Collected: 04/04/25 10:47 Last Resulted: 04/04/25 12:44                Neuro/Psych - negative ROS   (-) seizures, CVA    GI/Hepatic/Renal    (-) hiatal hernia, GERD (Pt denies.)    Endo/Other    (-) diabetes mellitus  Abdominal                  Anesthesia Plan:   ASA:  3  Plan:   Spinal  Post-op Pain Management: IV analgesics and Oral pain medication  Plan Comments: Discussed with patient the plan for spinal anesthesia.  Risks including but not limited to pain at needle insertion site, bleeding at needle insertion site, infection, low blood pressure, headache, nerve damage, and failed block and possibility to converting to general anesthesia if indicated have been discussed. All questions have been answered to patient's satisfaction. Pt expressed a thorough understanding and wishes to proceed.   Informed Consent Plan and Risks Discussed With:  Patient      I have informed Mary Oneill and/or legal guardian or family member of the nature of the anesthetic plan, benefits, risks including possible dental damage if relevant, major complications, and any alternative forms of anesthetic management.   All of the patient's questions were answered to the best of my ability. The patient desires the anesthetic management as planned.  Suri Dupont MD  4/15/2025 6:51 AM  Present on Admission:  **None**           [1]   Past Medical History:   Cataract     High cholesterol    Osteoarthritis    UTI (urinary tract infection)    Varicose vein of leg    bilateral    Visual impairment    glasses   [2]   Past Surgical History:  Procedure Laterality Date    Colonoscopy      Foot surgery Bilateral 2022    Buniectomy    Other surgical history      bilat leg varicose vein procedure    Total knee replacement Right 2012    Tubal ligation     [3]   Medications Prior to Admission   Medication Sig Dispense Refill Last Dose/Taking    ezetimibe-simvastatin 10-40 MG Oral Tab Take 1 tablet by mouth every evening.   4/14/2025 Evening    Cholecalciferol (VITAMIN D-3 OR) Take 2,000 Int'l Units/day by mouth in the morning.   4/13/2025    cyanocobalamin 1000 MCG Oral Tab Take 1 tablet (1,000 mcg total) by mouth every other day.   4/13/2025    acetaminophen 500 MG Oral Tab Take 1 tablet (500 mg total) by mouth every 6 (six) hours as needed for Pain.   Past Week    ibuprofen (ADVIL) 200 MG Oral Tab Take 1 tablet (200 mg total) by mouth every 6 (six) hours as needed for Pain.   More than a month   [4]   Current Facility-Administered Medications Ordered in Epic   Medication Dose Route Frequency Provider Last Rate Last Admin    lactated ringers infusion   Intravenous Continuous Randall Muñoz MD 20 mL/hr at 04/15/25 0619 New Bag at 04/15/25 0619    ceFAZolin (Ancef) 2g in 10mL IV syringe premix  2 g Intravenous Once Randall Muñoz MD        clonidine-EPINEPHrine-ropivacaine-ketorolac (CERTS) (Duraclon-Adrenalin-Naropin-Toradol) pain cocktail irrigation   Intra-articular Once (Intra-Op) Randall Muñoz MD         No current [facility]-ordered outpatient medications on file.   [5] No Known Allergies  [6]   Family History  Problem Relation Age of Onset    Diabetes Father     Obesity Father     Lipids Father     Heart Disorder Father     Cancer Mother         breast    Diabetes Sister     Diabetes Brother     Cancer Maternal Aunt    [7]   Social History  Socioeconomic History    Marital  status:    Tobacco Use    Smoking status: Never    Smokeless tobacco: Never   Vaping Use    Vaping status: Never Used   Substance and Sexual Activity    Alcohol use: Not Currently     Comment: rarely    Drug use: Never

## 2025-04-16 LAB
ANION GAP SERPL CALC-SCNC: 7 MMOL/L (ref 0–18)
BLOOD TYPE BARCODE: 7300
BLOOD TYPE BARCODE: 7300
BUN BLD-MCNC: 8 MG/DL (ref 9–23)
CALCIUM BLD-MCNC: 7.6 MG/DL (ref 8.7–10.4)
CHLORIDE SERPL-SCNC: 102 MMOL/L (ref 98–112)
CO2 SERPL-SCNC: 25 MMOL/L (ref 21–32)
CREAT BLD-MCNC: 0.53 MG/DL (ref 0.55–1.02)
DEPRECATED RDW RBC AUTO: 39.8 FL (ref 35.1–46.3)
DEPRECATED RDW RBC AUTO: 40.7 FL (ref 35.1–46.3)
EGFRCR SERPLBLD CKD-EPI 2021: 92 ML/MIN/1.73M2 (ref 60–?)
ERYTHROCYTE [DISTWIDTH] IN BLOOD BY AUTOMATED COUNT: 12.6 % (ref 11–15)
ERYTHROCYTE [DISTWIDTH] IN BLOOD BY AUTOMATED COUNT: 12.7 % (ref 11–15)
GLUCOSE BLD-MCNC: 103 MG/DL (ref 70–99)
HCT VFR BLD AUTO: 32 % (ref 35–48)
HCT VFR BLD AUTO: 35.8 % (ref 35–48)
HGB BLD-MCNC: 11.4 G/DL (ref 12–16)
HGB BLD-MCNC: 12.4 G/DL (ref 12–16)
MCH RBC QN AUTO: 30.5 PG (ref 26–34)
MCH RBC QN AUTO: 30.6 PG (ref 26–34)
MCHC RBC AUTO-ENTMCNC: 34.6 G/DL (ref 31–37)
MCHC RBC AUTO-ENTMCNC: 35.6 G/DL (ref 31–37)
MCV RBC AUTO: 85.8 FL (ref 80–100)
MCV RBC AUTO: 88 FL (ref 80–100)
PLATELET # BLD AUTO: 181 10(3)UL (ref 150–450)
PLATELET # BLD AUTO: 182 10(3)UL (ref 150–450)
POTASSIUM SERPL-SCNC: 4.5 MMOL/L (ref 3.5–5.1)
RBC # BLD AUTO: 3.73 X10(6)UL (ref 3.8–5.3)
RBC # BLD AUTO: 4.07 X10(6)UL (ref 3.8–5.3)
SODIUM SERPL-SCNC: 134 MMOL/L (ref 136–145)
UNIT VOLUME: 350 ML
UNIT VOLUME: 350 ML
WBC # BLD AUTO: 10.6 X10(3) UL (ref 4–11)
WBC # BLD AUTO: 9.8 X10(3) UL (ref 4–11)

## 2025-04-16 PROCEDURE — 99215 OFFICE O/P EST HI 40 MIN: CPT | Performed by: FAMILY MEDICINE

## 2025-04-16 RX ORDER — TRAMADOL HYDROCHLORIDE 50 MG/1
50 TABLET ORAL EVERY 8 HOURS SCHEDULED
Status: SHIPPED | COMMUNITY
Start: 2025-04-16

## 2025-04-16 RX ORDER — PANTOPRAZOLE SODIUM 40 MG/1
FOR SUSPENSION ORAL
Status: SHIPPED | COMMUNITY

## 2025-04-16 RX ORDER — MELOXICAM 15 MG/1
15 TABLET ORAL DAILY
Status: SHIPPED | COMMUNITY
Start: 2025-04-16

## 2025-04-16 RX ORDER — OXYCODONE HYDROCHLORIDE 5 MG/1
5 TABLET ORAL EVERY 4 HOURS PRN
Status: SHIPPED | COMMUNITY
Start: 2025-04-16

## 2025-04-16 RX ORDER — SENNOSIDES 8.6 MG
325 CAPSULE ORAL 2 TIMES DAILY
Status: SHIPPED | COMMUNITY
Start: 2025-04-16

## 2025-04-16 RX ORDER — GABAPENTIN 100 MG/1
100 CAPSULE ORAL EVERY 8 HOURS SCHEDULED
Status: SHIPPED | COMMUNITY
Start: 2025-04-16

## 2025-04-16 RX ORDER — ACETAMINOPHEN 500 MG
1000 TABLET ORAL EVERY 8 HOURS SCHEDULED
Status: SHIPPED | COMMUNITY
Start: 2025-04-16

## 2025-04-16 RX ORDER — TRAMADOL HYDROCHLORIDE 50 MG/1
50 TABLET ORAL EVERY 6 HOURS PRN
Status: DISCONTINUED | OUTPATIENT
Start: 2025-04-16 | End: 2025-04-17

## 2025-04-16 NOTE — PROGRESS NOTES
Progress Note     Mary Oneill Patient Status:  Outpatient in a Bed    1940 MRN [id number]   Location [facility] 2W/SW Attending Marie Yen MD   Hosp Day # 0 PCP Ravi Parks DO     Chief Complaint: patient presented with No chief complaint on file.      Subjective:   S: Patient doing well.  Walked with physical therapy, pt had episode of dizziness while sitting in chair and resolve on its now.     Review of Systems:   10 point ROS completed and was negative, except for pertinent positive and negatives stated in subjective.    Objective:   Vital signs:  Temp:  [97 °F (36.1 °C)-97.8 °F (36.6 °C)] 97.8 °F (36.6 °C)  Pulse:  [49-63] 61  Resp:  [10-23] 12  BP: ()/(46-96) 125/62  SpO2:  [89 %-100 %] 100 %  AO: (108-158)/(49-75) 157/68    Wt Readings from Last 6 Encounters:   04/15/25 155 lb (70.3 kg)   25 159 lb (72.1 kg)         Physical Exam:    General: No acute distress. Alert ,         Respiratory: Clear to auscultation bilaterally. No wheezes. No rhonchi.  Cardiovascular: S1, S2. Regular rate and rhythm. No murmurs, rubs or gallops.   Abdomen: Soft, nontender, nondistended.  Positive bowel sounds. No rebound or guarding.  Neurologic: No focal neurological deficits.   Musculoskeletal: Moves all extremities.  Extremities: No edema.    Results:   Diagnostic Data:      Labs:    Labs Last 24 Hours:   BMP     CBC    Other     Na 134 Cl 102 BUN 8 Glu 103   Hb 12.4   PTT - Procal -   K 4.5 CO2 25.0 Cr 0.53   WBC 10.6 >< .0  INR - CRP -   Renal Lytes Endo    Hct 35.8   Trop - D dim -   eGFR - Ca 7.6 POC Gluc  -    LFT   pBNP - Lactic -   eGFR AA - PO4 - A1c -   AST - APk - Prot -  LDL -     Mg - TSH -   ALT - T fernando - Alb -        COVID-19 Lab Results    COVID-19  No results found for: \"COVID19\"    Pro-Calcitonin  No results for input(s): \"PCT\" in the last 168 hours.    Cardiac  No results for input(s): \"TROP\", \"PBNP\" in the last 168 hours.    Creatinine Kinase  No results for  input(s): \"CK\" in the last 168 hours.    Inflammatory Markers  No results for input(s): \"CRP\", \"JEN\", \"LDH\", \"DDIMER\" in the last 168 hours.    Imaging: Imaging data reviewed in Epic.    Medications: Scheduled Medications[1]    Assessment & Plan:   ASSESSMENT / PLAN:     Dizzy  Hb stable   Bolus IVF  VSS  Monitor tonight       Right hip osteoarthritis  Status post right total hip arthroplasty  Physical therapy  Ortho following  Pain management    History of hyperlipidemia  Continue medications     Quality:  DVT Prophylaxis: Asp BID   CODE status: FULL   DISPO: pending clinical improvement.   Estimated date of discharge: To be determined  Discharge is dependent on: Improved clinical status  At this point Patient is expected to be discharge to: Home versus rehab      Plan of care discussed with Patient and RN.     Coordinated care with providers and counseling re: treatment plan and workup     Marie Yen MD    Supplementary Documentation:         **Certification      PHYSICIAN Certification of Need for Inpatient Hospitalization - Initial Certification    Patient will require inpatient services that will reasonably be expected to span two midnight's based on the clinical documentation in H+P.   Based on patients current state of illness, I anticipate that, after discharge, patient will require TBD.         I personally reviewed the available laboratories, imaging including operative report. I discussed/will discuss the case with patient and her nurse. I ordered laboratories studies. I adjusted medications including not applicable today. Medical decision making high, risk is high.     >55min spent, >50% spent counseling and coordinating care in the form of educating pt/family and d/w consultants and staff. Most of the time spent discussing the above plan.                 [1]    sennosides  17.2 mg Oral Nightly    docusate sodium  100 mg Oral BID    famotidine  20 mg Oral BID    Or    famotidine  20 mg Intravenous  BID    aspirin  325 mg Oral BID    traMADol  50 mg Oral 4 times per day    acetaminophen  1,000 mg Oral q6h    celecoxib  200 mg Oral BID    ezetimibe (Zetia) 10 mg, atorvastatin (Lipitor) 40 mg for Vytorin 10-80 ([facility] only)   Oral Nightly

## 2025-04-16 NOTE — PHYSICAL THERAPY NOTE
PHYSICAL THERAPY EVALUATION - INPATIENT     Room Number: 209/209-A  Evaluation Date: 2025  Type of Evaluation: Initial   Physician Order: PT Eval and Treat    Presenting Problem:  (R VAHID)     Reason for Therapy: Mobility Dysfunction and Discharge Planning    PHYSICAL THERAPY ASSESSMENT   Patient is a 84 year old female admitted 4/15/2025.  Prior to admission, patient's baseline is independent.  Patient is currently functioning near baseline with bed mobility, transfers, and gait.  Patient is requiring modified independent as a result of the following impairments: medical status and THP's .  Physical Therapy will continue to follow for duration of hospitalization.    Patient will benefit from continued skilled PT Services at discharge to promote prior level of function.  Anticipate patient will return home with home health PT.    PLAN DURING HOSPITALIZATION  Nursing Mobility Recommendation : 1 Assist  PT Device Recommendation: Rolling walker  PT Treatment Plan: Bed mobility, Patient education, Family education, Gait training, Transfer training  Rehab Potential : Fair  Frequency (Obs): Daily     PHYSICAL THERAPY MEDICAL/SOCIAL HISTORY   History related to current admission:      Problem List  Principal Problem:    Primary osteoarthritis of right hip  Active Problems:    Hyperlipidemia    S/P hip replacement, right      HOME SITUATION  Type of Home: House  Home Layout: One level                     Lives With: Significant other    Drives: Yes   Patient Regularly Uses: None     Prior Level of [place]: ind    SUBJECTIVE  \"I want to get up but they are not letting me\"    PHYSICAL THERAPY EXAMINATION   OBJECTIVE  Precautions: Bed/chair alarm, VAHID - posterior  Fall Risk: Standard fall risk    WEIGHT BEARING RESTRICTION       PAIN ASSESSMENT  Ratin  Location:  (R hip)       COGNITION  Overall Cognitive Status:  WFL - within functional limits    RANGE OF MOTION AND STRENGTH ASSESSMENT  Upper extremity ROM and  strength are within functional limits   Lower extremity ROM is within functional limits   Lower extremity strength is within functional limits     BALANCE  Static Sitting: Good  Dynamic Sitting: Good  Static Standing: Fair -  Dynamic Standing: Fair -    ADDITIONAL TESTS                                    NEUROLOGICAL FINDINGS                      ACTIVITY TOLERANCE                         O2 WALK       AM-Located within [facility] '6-Clicks' INPATIENT SHORT FORM - BASIC MOBILITY  How much difficulty does the patient currently have...  Patient Difficulty: Turning over in bed (including adjusting bedclothes, sheets and blankets)?: A Little   Patient Difficulty: Sitting down on and standing up from a chair with arms (e.g., wheelchair, bedside commode, etc.): A Little   Patient Difficulty: Moving from lying on back to sitting on the side of the bed?: A Little   How much help from another person does the patient currently need...   Help from Another: Moving to and from a bed to a chair (including a wheelchair)?: A Little   Help from Another: Need to walk in hospital room?: A Little   Help from Another: Climbing 3-5 steps with a railing?: A Little     AM-PAC Score:  Raw Score: 18   Approx Degree of Impairment: 46.58%   Standardized Score (AM-PAC Scale): 43.63   CMS Modifier (G-Code): CK    FUNCTIONAL ABILITY STATUS  Functional Mobility/Gait Assessment  Gait Assistance: Contact guard assist  Distance (ft): 350  Assistive Device: Rolling walker  Pattern:  (reciprocal)  Rolling: supervision  Supine to Sit: supervision  Sit to Supine: supervision  Sit to Stand: supervision    Exercise/Education Provided:  Bed mobility  Gait training  Transfer training    Skilled Therapy Provided: gait training    The patient's Approx Degree of Impairment: 46.58% has been calculated based on documentation in the [facility] '6 clicks' Inpatient Basic Mobility Short Form.  Research supports that patients with this level of impairment may benefit from HH.  Final  disposition will be made by interdisciplinary medical team.    Patient End of Session: Up in chair    CURRENT GOALS  Goals to be met by:   Patient Goal Patient's self-stated goal is: home   Goal #1 Patient is able to demonstrate supine - sit EOB @ level: modified independent     Goal #1   Current Status    Goal #2 Patient is able to demonstrate transfers Sit to/from Stand at assistance level: modified independent with walker - rolling     Goal #2  Current Status    Goal #3 Patient is able to ambulate 300 feet with assist device: walker - rolling at assistance level: modified independent   Goal #3   Current Status    Goal #4 Patient will negotiate 3 stairs/one curb w/ assistive device and supervision   Goal #4   Current Status    Goal #5 Patient to demonstrate independence with home activity/exercise instructions provided to patient in preparation for discharge.   Goal #5   Current Status    Goal #6    Goal #6  Current Status      Patient Evaluation Complexity Level:  History Low - no personal factors and/or co-morbidities   Examination of body systems Low -  addressing 1-2 elements   Clinical Presentation Low- Stable   Clinical Decision Making  Low Complexity     Gait Trainin minutes

## 2025-04-16 NOTE — PROGRESS NOTES
4/16/2025  Admission date 4/15/2025      S: Patient doing well. Denies any numbness/tingling, F/C/S, N/V/D, SOB, Chest Pain, Abdominal Pain. Admitted to ICU postop due to 1700 EBL and hypotension. Hgb levels 11.3 and 12.4 this am. Patient is feeling well and motivated to work with therapy.       O:    Vitals:    04/16/25 1015   BP:    Pulse: 57   Resp: 11   Temp:        Data Review: {  Recent Results (from the past 24 hours)   Prepare RBC STAT    Collection Time: 04/16/25 12:40 AM   Result Value Ref Range    Blood Product [id number]     Unit Number [id number]     UNIT ABO B     UNIT RH POS     Product Status Presumed Transfused     Expiration Date 679930137970     Blood Type Barcode 7300     Unit Volume 350 ml   Prepare RBC STAT    Collection Time: 04/16/25 12:40 AM   Result Value Ref Range    Blood Product [id number]     Unit Number [id number]     UNIT ABO B     UNIT RH POS     Product Status Presumed Transfused     Expiration Date 389895721663     Blood Type Barcode 7300     Unit Volume 350 ml   Basic Metabolic Panel (8)    Collection Time: 04/16/25  5:58 AM   Result Value Ref Range    Glucose 103 (H) 70 - 99 mg/dL    Sodium 134 (L) 136 - 145 mmol/L    Potassium      Chloride 102 98 - 112 mmol/L    CO2 25.0 21.0 - 32.0 mmol/L    Anion Gap 7 0 - 18 mmol/L    BUN      Creatinine 0.53 (L) 0.55 - 1.02 mg/dL    BUN/CREA Ratio      Calcium, Total 7.6 (L) 8.7 - 10.4 mg/dL    Calculated Osmolality      eGFR-Cr 92 >=60 mL/min/1.73m2   CBC, Platelet; No Differential    Collection Time: 04/16/25  5:58 AM   Result Value Ref Range    WBC      RBC      HGB      HCT      MCV      MCH      MCHC      RDW      RDW-SD      PLT      Immature Platelet Fraction     CBC, Platelet; No Differential    Collection Time: 04/16/25  7:03 AM   Result Value Ref Range    WBC 9.8 4.0 - 11.0 x10(3) uL    RBC 3.73 (L) 3.80 - 5.30 x10(6)uL    HGB 11.4 (L) 12.0 - 16.0 g/dL    HCT 32.0 (L) 35.0 - 48.0 %    MCV 85.8 80.0 - 100.0 fL    MCH 30.6  26.0 - 34.0 pg    MCHC 35.6 31.0 - 37.0 g/dL    RDW 12.6 11.0 - 15.0 %    RDW-SD 39.8 35.1 - 46.3 fL    .0 150.0 - 450.0 10(3)uL   CBC, Platelet; No Differential    Collection Time: 04/16/25  8:40 AM   Result Value Ref Range    WBC 10.6 4.0 - 11.0 x10(3) uL    RBC 4.07 3.80 - 5.30 x10(6)uL    HGB 12.4 12.0 - 16.0 g/dL    HCT 35.8 35.0 - 48.0 %    MCV 88.0 80.0 - 100.0 fL    MCH 30.5 26.0 - 34.0 pg    MCHC 34.6 31.0 - 37.0 g/dL    RDW 12.7 11.0 - 15.0 %    RDW-SD 40.7 35.1 - 46.3 fL    .0 150.0 - 450.0 10(3)uL   REDRAW BMP    Collection Time: 04/16/25  8:40 AM   Result Value Ref Range    Potassium 4.5 3.5 - 5.1 mmol/L    BUN 8 (L) 9 - 23 mg/dL     Gen:  A&O x 3, VSS, Afebrile    Abd: Soft Nontender    Lower Extremity:   -  Dressing clean, dry, intact  -  2+ Pedal pulses Bilaterally  -  Tibialis Anterior/Gastroc and Soleus/ Extensor Halluses Longus motor in tact  -  Sensory in tact in sural/Saphenous/tibial/superficial peroneal/deep peroneal nerve distributions  -  Calf soft/non-tender with no palpable cords        A/P: S/P VAHID 1 Day Post-Op RTHA    1. Weight Bearing: WBAT w/posterior hip precautions  2. Deep Venous thrombosis prophylaxis - Compression stocking/ASA  3. Continue Physical Therapy, Mobilize out of bed - OK to work with OT and PT today.  4. Pain control- modified as needed  5. Discharge Planning- pending clearance from Physical Therapy/Social work services. Plan for discharge to home with home health.     JEAN-CLAUDE Quiroga  Nurse Practitioner for Dr. Randall Muñoz  P: [phone]  C: [phone]  F: [phone]

## 2025-04-16 NOTE — PLAN OF CARE
13:22- Patient endoising slight dizziness and \"fuzzy vision\", stating occurred yesterday after procedure as well and lasted 20 minutes. Aylin BERMEO notified, at bedside to assess. Orders received and carried out. Patient denies dizziness and \"fuzzy vision\" at this time, lasted approx. 15 minutes. Dr. Yen notified of the above. [facility] NP notified.     Patient's son was at bedside.  Transfer order acknowledged.  Report given to Cathy DOW. Patient's belongings packed.  Patient transferred to room 419.

## 2025-04-16 NOTE — PROGRESS NOTES
4/16/2025    12:00 PM 4/16/2025     1:17 PM   Vitals History   /65 125/62   BP Location Right arm    Pulse 63 61   Resp 23 12   Temp 97.8 °F (36.6 °C)    SpO2 100 % 100 %      Lab Results   Component Value Date    WBC 10.6 04/16/2025    HGB 12.4 04/16/2025    HCT 35.8 04/16/2025    .0 04/16/2025    CREATSERUM 0.53 04/16/2025    BUN 8 04/16/2025     04/16/2025    K 4.5 04/16/2025     04/16/2025    CO2 25.0 04/16/2025     04/16/2025    CA 7.6 04/16/2025         Asked to evaluate for vision changes    On exam, alert and oriented x 4, sitting in chair  No motor defecits noted  States her vision seemed a bit blurry but is now resolved, lasted about 5 minutes  Denies diploplia  Strength MAEW and equal  No visual field loss  Denies headache  Denies dizziness  BP normal  No recent narcotic medication given     Plan:  Monitor BP   Potential post anesthesia side effect  Will give NS bolus 250 ml  Encouraged oral intake. Possibly volume depleted  Ok with transfer to ortho floor   CT head if reoccurs   Low suspicion for CVA     Aylin Segura NP  [facility]

## 2025-04-16 NOTE — PLAN OF CARE
Patient is POD 1, transferred from CCU. A/Ox4. On RA. Tolerating diet. Voiding freely. Up SBA w walker. Scheduled tylenol for pain. Call light and personal items within reach. Family at bedside. Plan for discharge when medically cleared.      Problem: PAIN - ADULT  Goal: Verbalizes/displays adequate comfort level or patient's stated pain goal  Description: INTERVENTIONS:- Encourage pt to monitor pain and request assistance- Assess pain using appropriate pain scale- Administer analgesics based on type and severity of pain and evaluate response- Implement non-pharmacological measures as appropriate and evaluate response- Consider cultural and social influences on pain and pain management- Manage/alleviate anxiety- Utilize distraction and/or relaxation techniques- Monitor for opioid side effects- Notify MD/LIP if interventions unsuccessful or patient reports new pain- Anticipate increased pain with activity and pre-medicate as appropriate  Outcome: Progressing     Problem: SAFETY ADULT - FALL  Goal: Free from fall injury  Description: INTERVENTIONS:- Assess pt frequently for physical needs- Identify cognitive and physical deficits and behaviors that affect risk of falls.- [place] fall precautions as indicated by assessment.- Educate pt/family on patient safety including physical limitations- Instruct pt to call for assistance with activity based on assessment- Modify environment to reduce risk of injury- Provide assistive devices as appropriate- Consider OT/PT consult to assist with strengthening/mobility- Encourage toileting schedule  Outcome: Progressing     Problem: DISCHARGE PLANNING  Goal: Discharge to home or other facility with appropriate resources  Description: INTERVENTIONS:- Identify barriers to discharge w/pt and caregiver- Include patient/family/discharge partner in discharge planning- Arrange for needed discharge resources and transportation as appropriate- Identify discharge learning needs (meds, wound  care, etc)- Arrange for interpreters to assist at discharge as needed- Consider post-discharge preferences of patient/family/discharge partner- Complete POLST form as appropriate- Assess patient's ability to be responsible for managing their own health- Refer to Case Management Department for coordinating discharge planning if the patient needs post-hospital services based on physician/LIP order or complex needs related to functional status, cognitive ability or social support system  Outcome: Progressing     Problem: GENITOURINARY - ADULT  Goal: Absence of urinary retention  Description: INTERVENTIONS:- Assess patient’s ability to void and empty bladder- Monitor intake/output and perform bladder scan as needed- Follow urinary retention protocol/standard of care- Consider collaborating with pharmacy to review patient's medication profile- Implement strategies to promote bladder emptying  Outcome: Progressing     Problem: METABOLIC/FLUID AND ELECTROLYTES - ADULT  Goal: Electrolytes maintained within normal limits  Description: INTERVENTIONS:- Monitor labs and rhythm and assess patient for signs and symptoms of electrolyte imbalances- Administer electrolyte replacement as ordered- Monitor response to electrolyte replacements, including rhythm and repeat lab results as appropriate- Fluid restriction as ordered- Instruct patient on fluid and nutrition restrictions as appropriate  Outcome: Progressing     Problem: SKIN/TISSUE INTEGRITY - ADULT  Goal: Skin integrity remains intact  Description: INTERVENTIONS- Assess and document risk factors for pressure ulcer development- Assess and document skin integrity- Monitor for areas of redness and/or skin breakdown- Initiate interventions, skin care algorithm/standards of care as needed  Outcome: Progressing  Goal: Incision(s), wounds(s) or drain site(s) healing without S/S of infection  Description: INTERVENTIONS:- Assess and document risk factors for pressure ulcer  development- Assess and document skin integrity- Assess and document dressing/incision, wound bed, drain sites and surrounding tissue- Implement wound care per orders- Initiate isolation precautions as appropriate- Initiate Pressure Ulcer prevention bundle as indicated  Outcome: Progressing     Problem: MUSCULOSKELETAL - ADULT  Goal: Return mobility to safest level of function  Description: INTERVENTIONS:- Assess patient stability and activity tolerance for standing, transferring and ambulating w/ or w/o assistive devices- Assist with transfers and ambulation using safe patient handling equipment as needed- Ensure adequate protection for wounds/incisions during mobilization- Obtain PT/OT consults as needed- Advance activity as appropriate- Communicate ordered activity level and limitations with patient/family  Outcome: Progressing  Goal: Maintain proper alignment of affected body part  Description: INTERVENTIONS:- Support and protect limb and body alignment per provider's orders- Instruct and reinforce with patient and family use of appropriate assistive device and precautions (e.g. spinal or hip dislocation precautions)  Outcome: Progressing     Problem: Impaired Functional Mobility  Goal: Achieve highest/safest level of mobility/gait  Description: Interventions:- Assess patient's functional ability and stability- Promote increasing activity/tolerance for mobility and gait- Educate and engage patient/family in tolerated activity level and precautions- Recommend use of  RW for transfers and ambulation  Outcome: Progressing     Problem: Impaired Activities of Daily Living  Goal: Achieve highest/safest level of independence in self care  Description: Interventions:- Assess ability and encourage patient to participate in ADLs to maximize function- Promote sitting position while performing ADLs such as feeding, grooming, and bathing- Educate and encourage patient/family in tolerated functional activity level and  precautions during self-care- Encourage patient to incorporate impaired side during daily activities to promote function  Outcome: Progressing

## 2025-04-16 NOTE — DISCHARGE INSTRUCTIONS
Frequently Asked Questions after  Total Hip/Knee Arthroplasty  Dr. Randall Muñoz- Surgeon  Mechelle Musa - Nurse Practitioner   Elvie Becker -   [phone]    PLEASE TAKE YOUR MEDICATIONS AS DIRECTED. YOU SHOULD HAVE RECEIVED YOUR PRESCRIPTIONS BEFORE SURGERY:       (Estimated schedule)              -Aspirin 325mg - take 1 tablet by mouth twice daily with food for 4 weeks as directed   (6am, 6pm)    -Acetaminophen 500mg - take 2 tablets by mouth every 8 hours as directed    (6am, 2pm, 10pm)   -[id number] - take 1 tablet by mouth once daily with food as directed     (6am)    -Gabapentin 100mg - take 1 capsule by mouth every 8 hours as directed     (6am, 2pm, 10pm)  -Pantoprazole 40mg - take 1 tablet by mouth daily as directed (while on aspirin therapy)   (6am)    -Tramadol 50mg - take 2 tablets by mouth every 8 hours as directed     (6am, 2pm, 10pm)  -Oxycodone 5mg - take 1 tablet by mouth every 4 hours as needed for breakthrough pain    -Senna Plus 8.6mg-50mg capsule - 2 capsules by mouth twice a day as needed for constipation    If you have any questions about your medications, please call our office [phone].    ----------------------------------------------------------------------------------------------------    Formerly [facility] agency: [facility] [phone]  If they haven't contacted you already, they should call you once you return home. If you have not had a call by 11am, please call the number above.     When should I follow up with Dr. Muñoz's team?  You should follow up with Dr. Muñoz/ his Nurse Practitioner at 2weeks and 6 weeks after your surgery.  These appointments should have been made at the time you scheduled your surgery, and the dates/times should be in your [facility] information booklet and in your provider follow up.  If you aren't sure about this date, please call our office [phone]. Your     Is swelling normal?  YES! We  anticipate swelling, but this can be managed well with ice and elevation.  Please ice/elevate 4 times per day for 30 minutes at a minimum. When elevating please make sure the surgical leg is higher than the heart. A good way to do this is to lay completely flat and put the leg on an arm rest of your couch with a few pillows under the ankle. Please make sure the leg is straight when elevating.   If you are short of breath or have calf pain please call us or go to the ER before elevating the leg.    How should I take care of my incision and dressing?  The gauze dressing should be removed after 7 days unless saturated before then.   If dressing becomes saturated before the 7 day judd, please remove the gauze dressing sooner.  Stapled incisions: once the gauze dressing is removed, replaced with an ABD pad. Change daily.    How long am I going to be on a blood thinner?  You will be on a blood thinner (warfarin, aspirin, etc) for one month from your surgical date to prevent blood clots. Your medication is specified on your medication list.  If you were taking a blood thinner prior to surgery, you will continue this per the recommendation of the prescribing doctor.    How often should I have physical therapy after surgery?  If you are going directly to outpatient physical therapy:  Hip replacement- 3x/ week for 2 weeks  If you have home health care, this will be for a total of two weeks  Hip replacement- 3x/ week     How long should I be taking my medications?  Continue to take your blood thinner (ie aspirin, coumadin/ warfarin, lovenox) and pantoprazole for 1 month from surgery.  If you were prescribed an anti-inflammatory medication (celecoxib, meloxicam, ibuprofen etc.), please continue to take this while on your blood thinner. Your pharmacist may instruct you differently. You will be on this for at least 6 weeks.  Continue your stool softener (colace) as long as your are taking narcotic pain medication.    What are the  signs of infection I should look for?  It is common for the knee and hip to be red and warm after surgery  Our suspicion for infection rises with any of the followin.  There is pus like discharge from the incision  2.  Your temperature is over 101 degrees  3.  It is painful to move the leg through a gentle range of motion  IF ANY OF THESE THREE OCCUR PLEASE CONTACT OUR OFFICE IMMEDIATELY or if it is after hours, please dial “zero” to talk to the physician on call when you hear the voicemail.      When can I drive?  You can drive when you are off all narcotic pain medications (including Norco, Oxycontin, oxycodone).  You must also feel that you are capable of driving safely; meaning you can push on the gas and brake with force if needed.    When can I shower?  The incision is covered with a cotton dressing (ABD). Please protect the incision with Saran wrap (brand name only) over the dressing and tape occlusively.  The Saran wrap is “water resistant” only.   DO NOT position yourself with the incisional area directly in the stream of water.  After shower, remove the Saran wrap and change your dressing. Change dressing daily.  If you have a Prevena vac dressing, you will need to cover the dressing AND vac if you are going to shower.   If you have staples, please use Saran Wrap over the surgical wound when showering. It should NOT get wet while staples are in place.  When can I go in a hot tub/bath/pool?  3 months from your surgery if you incision has COMPLETELY healed (no scabs or open areas)    How do I get a refill on my pain medications if necessary before my next appointment?  Oxycodone and Norco (hydrocodone- acetaminophen) require an electronic or physical prescription. Please phone the office at [phone] if you need a refills on medications as these need to be electronically prescribed.   Please understand that the requests will be taken care of as soon as possible but if it is the weekend they may not  be refilled until Monday. Please allow 24- 48 hours on refill requests.    How do I get a handicap placard?  We are happy to provide you with ONE handicap placard at the time of your surgery which will be good for six months.  Please ask our office at your pre-op or post-op appointment if you feel you will need a placard.  If you feel you need a handicap placard for longer than 6 months from surgery please contact your primary care physician.    What do I do after home health has discharged me?  We do not routinely prescribe outpatient physical therapy for our hip patients. We will evaluate your progress at your 2 week appointment and determine then if therapy will be necessary. Otherwise, your home health physical therapist should teach you a home exercise program.     If you have any questions related to medical issues unrelated to your knee or hip, please contact the physician that cleared you for surgery:     If you have questions pertaining to the surgery, please feel free to contact our office:   Phone # [phone]  Fax # [phone]    If you have arranged for outpatient physical therapy upon your discharge from the hospital, you will need to remove the gauze surgical dressing on post operative day 7. You should have received instructions on how to properly do this before you left the hospital.     Dressing change instructions:   The gauze dressing is to be removed on post-op day 7, unless the dressing is not adhering properly--in which case the dressing is to be changed sooner.  To remove the dressing, see instructions below.      Shower instructions:  Stapled incisions: the incision may NOT get wet in the shower after the gauze has been removed. Please wrap the your surgical wound in Saran Wrap when showering.    Call your surgeon's office if:  The dressing will not stay in place  If there is any skin issue such as tearing or blistering  More than 50% of the dressing becomes saturated  There is a  large amount of fluid coming from the incision  You experience unusual pain or odor    Ice/elevate:  Please ice/elevate 4 times per day for 30 minutes at a minimum.  When elevating please make sure the surgical leg is higher than the heart.  A good way to do this is to lay completely flat and put the leg on an arm rest of your couch with a few pillows under the ankle.  Please make sure the leg is straight when elevating.  If you are short of breath or have calf pain please call us or go to the ER before elevating the leg.    Orthopaedic Discharge Bowel Program while on Pain Medications (Opiods)    Docusate sodium (Colace) 100 mg after breakfast and at bedtime.  Polyethylene glycol (Glycolax or Miralax)17 Gm Daily.  Mix well in glass of water.  Hold both docusate sodium (Colace) and polyethylene glycol (Miralax) if greater than three stools per day.  If no stool after two days at home take senna (Senakot) 1-2 tablets at bedtime.  Repeat nightly untill stool occurs.      Continue program as long as continuing opioids.      Sometimes managing your health at home requires assistance.  The [facility] team has recognized your preference to use [facility], Phone: [phone] or Fax: [phone]. A representative from the home health agency will contact you or your family to schedule your first visit.

## 2025-04-16 NOTE — PROGRESS NOTES
Face to Face Encounter    I (or a non-physician practitioner working in collaboration with me) had a face to face encounter with this patient during which a medical condition was addressed which is the primary reason for home health care on : 4/16/2025    The encounter was in whole, or in part, for the following medical conditions which is the primary reason for home care. Joint replacement surgery    Based on my findings, the following services are medically necessary home health services (Check all that apply): Nursing, because vital signs monitoring, PT/ INR monitoring and assessment for signs and symptoms of bleeding (if pt on coumadin), wound/ incision assessment, pain assessment and instruction related to pain management, and Physical Therapy, because evaluation of environment for safety (pt is at fall risk), gait training and instruction in the use of assistive devices, instruction and monitoring of therapeutic exercise.    The following clinical findings and patient's condition support that this patient is homebound, because narcotic usage every 4-6 hours, inability to ambulate greater than 150 feet, inability to drive a vehicle, fatigue due to anemia, increased risk for infection and increased risk for bleeding.    Certification for Home Health Services  Based on the above findings, I certify that this patient is confined to the home (meets homebound criteria listed above) and needs intermittent skilled nursing care, physical therapy and /or speech therapy or continues to need occupational therapy.  The patient is under my care, and I have initiated the establishment of the plan of care. This patient will be followed by a physician who will periodically review the plan of care.     Mechelle Musa  Nurse Practitioner for Dr. Randall Muñoz  P: [phone]  F: [phone]

## 2025-04-16 NOTE — CM/SW NOTE
04/16/25 1000   CM/SW Referral Data   Referral Source Physician   Reason for Referral Discharge planning   Informant Patient   Medical Hx   Does patient have an established PCP? Yes  (Ravi Born)   Patient Info   Patient's Current Mental Status at Time of Assessment Alert;Oriented   Patient's Home Environment House   Number of Levels in Home 1   Number of Stair in Home 3 STEFFEN   Patient lives with Alone   Patient Status Prior to Admission   Independent with ADLs and Mobility Yes   Discharge Needs   Anticipated D/C needs Home health care     Pt discussed during nursing rounds. Dx right hip OA s/p VAHID w/Dr Muñoz. Home alone though patient does have person she cares for that stays w/her during the day. Independent and active w/o device prior to admission. [facility] has already been set up by surgeon to provide RN and PT services at [place]. Agency reserved in [facility], signed order uploaded. Pt currently in CCU post surgery due to EBL and hypotension. PT/OT evals scheduled for today.    Plan: Home w/[facility] pending evals and medical clearance.    / to remain available for support and/or discharge planning.     NABEEL Elizabeth    [phone]

## 2025-04-16 NOTE — PROGRESS NOTES
4/16/2025  Admission date 4/15/2025      S: Patient doing well. Denies any numbness/tingling, F/C/S, N/V/D, SOB, Chest Pain, Abdominal Pain. Pain well-controlled. Denies any dizziness. Admitted to ICU after EBL 1700 and hypotension. Hgb trend this am 11.4, 12.4. BMP pending.      O:    Vitals:    04/16/25 0800   BP: 128/65   Pulse: 59   Resp: 11   Temp: 97.1 °F (36.2 °C)       Data Review: {  Recent Results (from the past 24 hours)   CBC With Differential With Platelet    Collection Time: 04/15/25 10:54 AM   Result Value Ref Range    WBC 10.6 4.0 - 11.0 x10(3) uL    RBC 4.48 3.80 - 5.30 x10(6)uL    HGB 13.6 12.0 - 16.0 g/dL    HCT 39.1 35.0 - 48.0 %    MCV 87.3 80.0 - 100.0 fL    MCH 30.4 26.0 - 34.0 pg    MCHC 34.8 31.0 - 37.0 g/dL    RDW-SD 39.8 35.1 - 46.3 fL    RDW 12.4 11.0 - 15.0 %    .0 150.0 - 450.0 10(3)uL    Neutrophil Absolute Prelim 7.07 1.50 - 7.70 x10 (3) uL    Neutrophil Absolute 7.07 1.50 - 7.70 x10(3) uL    Lymphocyte Absolute 2.27 1.00 - 4.00 x10(3) uL    Monocyte Absolute 0.59 0.10 - 1.00 x10(3) uL    Eosinophil Absolute 0.55 0.00 - 0.70 x10(3) uL    Basophil Absolute 0.03 0.00 - 0.20 x10(3) uL    Immature Granulocyte Absolute 0.07 0.00 - 1.00 x10(3) uL    Neutrophil % 66.7 %    Lymphocyte % 21.5 %    Monocyte % 5.6 %    Eosinophil % 5.2 %    Basophil % 0.3 %    Immature Granulocyte % 0.7 %   RAINBOW DRAW LIGHT GREEN    Collection Time: 04/15/25 10:55 AM   Result Value Ref Range    Hold Lt Green Auto Resulted    Comp Metabolic Panel (14)    Collection Time: 04/15/25 10:55 AM   Result Value Ref Range    Glucose 129 (H) 70 - 99 mg/dL    Sodium 135 (L) 136 - 145 mmol/L    Potassium 4.1 3.5 - 5.1 mmol/L    Chloride 103 98 - 112 mmol/L    CO2 24.0 21.0 - 32.0 mmol/L    Anion Gap 8 0 - 18 mmol/L    BUN 8 (L) 9 - 23 mg/dL    Creatinine 0.74 0.55 - 1.02 mg/dL    BUN/CREA Ratio 10.8 10.0 - 20.0    Calcium, Total 8.6 (L) 8.7 - 10.4 mg/dL    Calculated Osmolality 280 275 - 295 mOsm/kg    eGFR-Cr 81  >=60 mL/min/1.73m2    ALT 14 10 - 49 U/L    AST 22 <34 U/L    Alkaline Phosphatase 39 (L) 55 - 142 U/L    Bilirubin, Total 0.5 0.2 - 1.1 mg/dL    Total Protein 5.1 (L) 5.7 - 8.2 g/dL    Albumin 3.5 3.2 - 4.8 g/dL    Globulin  1.6 (L) 2.0 - 3.5 g/dL    A/G Ratio 2.2 (H) 1.0 - 2.0   Prepare RBC STAT    Collection Time: 04/16/25 12:40 AM   Result Value Ref Range    Blood Product [id number]     Unit Number [id number]     UNIT ABO B     UNIT RH POS     Product Status Presumed Transfused     Expiration Date 926353581200     Blood Type Barcode 7300     Unit Volume 350 ml   Prepare RBC STAT    Collection Time: 04/16/25 12:40 AM   Result Value Ref Range    Blood Product [id number]     Unit Number [id number]     UNIT ABO B     UNIT RH POS     Product Status Presumed Transfused     Expiration Date 814275786815     Blood Type Barcode 7300     Unit Volume 350 ml   Basic Metabolic Panel (8)    Collection Time: 04/16/25  5:58 AM   Result Value Ref Range    Glucose 103 (H) 70 - 99 mg/dL    Sodium 134 (L) 136 - 145 mmol/L    Potassium      Chloride 102 98 - 112 mmol/L    CO2 25.0 21.0 - 32.0 mmol/L    Anion Gap 7 0 - 18 mmol/L    BUN      Creatinine 0.53 (L) 0.55 - 1.02 mg/dL    BUN/CREA Ratio      Calcium, Total 7.6 (L) 8.7 - 10.4 mg/dL    Calculated Osmolality      eGFR-Cr 92 >=60 mL/min/1.73m2   CBC, Platelet; No Differential    Collection Time: 04/16/25  5:58 AM   Result Value Ref Range    WBC      RBC      HGB      HCT      MCV      MCH      MCHC      RDW      RDW-SD      PLT      Immature Platelet Fraction     CBC, Platelet; No Differential    Collection Time: 04/16/25  7:03 AM   Result Value Ref Range    WBC 9.8 4.0 - 11.0 x10(3) uL    RBC 3.73 (L) 3.80 - 5.30 x10(6)uL    HGB 11.4 (L) 12.0 - 16.0 g/dL    HCT 32.0 (L) 35.0 - 48.0 %    MCV 85.8 80.0 - 100.0 fL    MCH 30.6 26.0 - 34.0 pg    MCHC 35.6 31.0 - 37.0 g/dL    RDW 12.6 11.0 - 15.0 %    RDW-SD 39.8 35.1 - 46.3 fL    .0 150.0 - 450.0 10(3)uL   CBC, Platelet;  No Differential    Collection Time: 04/16/25  8:40 AM   Result Value Ref Range    WBC 10.6 4.0 - 11.0 x10(3) uL    RBC 4.07 3.80 - 5.30 x10(6)uL    HGB 12.4 12.0 - 16.0 g/dL    HCT 35.8 35.0 - 48.0 %    MCV 88.0 80.0 - 100.0 fL    MCH 30.5 26.0 - 34.0 pg    MCHC 34.6 31.0 - 37.0 g/dL    RDW 12.7 11.0 - 15.0 %    RDW-SD 40.7 35.1 - 46.3 fL    .0 150.0 - 450.0 10(3)uL       Gen:  A&O x 3, VSS, Afebrile    Abd: Soft Nontender    Lower Extremity:   -  Dressing clean, dry, intact  -  2+ Pedal pulses Bilaterally  -  Tibialis Anterior/Gastroc and Soleus/ Extensor Halluses Longus motor in tact  -  Sensory in tact in sural/Saphenous/tibial/superficial peroneal/deep peroneal nerve distributions  -  Calf soft/non-tender with no palpable cords        A/P: S/P VAHID 1 Day Post-Op RTHA    1. Weight Bearing: WBAT w/posterior hip precautions  2. Deep Venous thrombosis prophylaxis - Compression stocking/ASA  3. Continue Physical Therapy, Mobilize out of bed  4. Pain control- modified as needed  5. Discharge Planning- pending clearance from Physical Therapy/Social work services. Plan for discharge to home with home health once cleared by therapy and medicine. Patient is feeling well this morning but has yet to be up out of bed. Has SO at home with dementia but states that she has neighbors available for additional postop assistance. Patient is motivated and wanting to start with therapy today.    JEAN-CLAUDE Quiroga  Nurse Practitioner for Dr. Randall Muñoz  P: [phone]  C: [phone]  F: [phone]

## 2025-04-16 NOTE — DISCHARGE SUMMARY
Ortho Discharge Summary     Patient ID:  Mary Oneill  [id number]  84 year old  12/5/1940      Admit Date: 4/15/2025    Discharge Date: 4/17/2025    Attending Physician: Randall Muñoz MD     Reason for admission: right hip DJD    Discharge Diagnoses: Degenerative joint disease right hip  S/P hip replacement, right    Discharge Condition: good    History of Present Illness:  Mary Oenill is a 84 year old female who has been followed by the orthopedic surgery service for right hip degenerative joint disease. Mary Oneill was previously seen and evaluated in the orthopedic clinic and diagnosed with hip degenerative joint disease. After nonoperative treatment measures such as physical therapy, activity modification, weight loss, and NSAIDs failed to improved the patient's symptoms, Mary Oneill wished to proceed with surgery and was therefore scheduled for the above-described procedure on an elective basis.      All risks, benefits and alternatives for total hip were discussed with the patient and informed consent was obtained for the surgical procedure. The risks discussed included but were not limited to: infection, nerve injury, arterial injury, stiffness, numbness, wear, lysis, need for re-operation, DVT, PE, loss of limb and loss of life. Medical clearance was obtained and there were no contraindications to move forward with surgery on 4/15/2025.      Hospital Course:  The patient was taken to the operating room on the day of admission for Procedure(s): right total hip arthroplasty. The patient tolerated the procedure well and was taken to the post operative room in a stable condition. The patient was noted to be neurovascularly intact post operatively. The post operative films show components in excellent condition. The patient had an estimated blood loss of 1700 ml and therefore was transferred to ICU for postoperative monitoring.     The patient dressing was checked on post operative day 1 and  found to be dry and intact. The gauze dressing was left in place. Occupational and physical therapy worked with the patient each day and they performed well. The patient had adequate pain control with oral medication. They were given 24 hours of perioperative antibiotic prophylaxis. The patient was managed in conjunction with the medical team for general medical issues. There were no complications throughout the hospital stay. The patient met physical therapy goals and was discharged. The patient hemoglobin was stable at 10.8.    Randall Muñoz MD was aware of all events throughout the hospital course and supervised this patient's care. The discharge of this patient was coordinated with the discharge planner and was discharged on the medication as listed in medical reconciliation.    Consults: ANCILLARY CONSULT TO CASE MANAGEMENT, internal medicine, critical care    Disposition: stable, discharge to home with home health       Discharge Medications        START taking these medications        Instructions Prescription details   aspirin 325 MG Tbec      Take 1 tablet (325 mg total) by mouth in the morning and 1 tablet (325 mg total) before bedtime.   Refills: 0     Naloxone HCl 4 MG/0.1ML Liqd      4 mg by Nasal route as needed. If patient remains unresponsive, repeat dose in other nostril 2-5 minutes after first dose.   Quantity: 1 kit  Refills: 0     oxyCODONE 5 MG Tabs      Take 1 tablet (5 mg total) by mouth every 4 (four) hours as needed.   Refills: 0     Sennosides 17.2 MG Tabs      Take 1 tablet (17.2 mg total) by mouth 2 (two) times daily.   Refills: 0     traMADol 50 MG Tabs  Commonly known as: Ultram      Take 1 tablet (50 mg total) by mouth every 8 (eight) hours.   Refills: 0            CHANGE how you take these medications        Instructions Prescription details   acetaminophen 500 MG Tabs  Commonly known as: Tylenol Extra Strength  What changed:   how much to take  when to take this  reasons to take  this      Take 2 tablets (1,000 mg total) by mouth every 8 (eight) hours.   Refills: 0            CONTINUE taking these medications        Instructions Prescription details   cyanocobalamin 1000 MCG Tabs  Commonly known as: Vitamin B12      Take 1 tablet (1,000 mcg total) by mouth every other day.   Refills: 0     ezetimibe-simvastatin 10-40 MG Tabs  Commonly known as: Vytorin      Take 1 tablet by mouth every evening.   Refills: 0     gabapentin 100 MG Caps  Commonly known as: Neurontin      Take 1 capsule (100 mg total) by mouth every 8 (eight) hours.   Refills: 0     Meloxicam 15 MG Tabs      Take 1 tablet (15 mg total) by mouth daily.   Refills: 0     Pantoprazole Sodium 40 MG Pack      Take by mouth. Take while on aspirin therapy   Refills: 0     VITAMIN D-3 OR      Take 2,000 Int'l Units/day by mouth in the morning.   Refills: 0            STOP taking these medications      Advil 200 MG Tabs  Generic drug: ibuprofen                  Where to Get Your Medications        These medications were sent to [facility][id number] - [place] [id number] [facility]  ([facility] & [facility], [phone], [phone]  [address] [place] [id number]      Phone: [phone]   Naloxone HCl 4 MG/0.1ML Liqd           Patient Instructions:     Activity: activity as tolerated and no driving for 2 weeks    Diet: regular     Wound Care: The incision is covered with a Silverlon (gauze) dressing. Please protect the incision with Saran wrap (brand name only) over the dressing and tape occlusively.  The Saran wrap is “water resistant” only.   DO NOT position yourself with the incisional area directly in the stream of water.  After shower, remove the Saran wrap and change your dressing. Change dressing daily.     Home Health Agency:  [facility]    JEAN-CLAUDE Quiroga  Nurse Practitioner for Dr. Randall Muñoz  P: [phone]

## 2025-04-17 VITALS
RESPIRATION RATE: 18 BRPM | HEART RATE: 76 BPM | HEIGHT: 65 IN | WEIGHT: 155 LBS | SYSTOLIC BLOOD PRESSURE: 113 MMHG | BODY MASS INDEX: 25.83 KG/M2 | OXYGEN SATURATION: 98 % | DIASTOLIC BLOOD PRESSURE: 56 MMHG | TEMPERATURE: 97 F

## 2025-04-17 LAB
DEPRECATED RDW RBC AUTO: 39.8 FL (ref 35.1–46.3)
ERYTHROCYTE [DISTWIDTH] IN BLOOD BY AUTOMATED COUNT: 12.4 % (ref 11–15)
HCT VFR BLD AUTO: 31 % (ref 35–48)
HGB BLD-MCNC: 10.8 G/DL (ref 12–16)
MCH RBC QN AUTO: 30.3 PG (ref 26–34)
MCHC RBC AUTO-ENTMCNC: 34.8 G/DL (ref 31–37)
MCV RBC AUTO: 87.1 FL (ref 80–100)
PLATELET # BLD AUTO: 179 10(3)UL (ref 150–450)
RBC # BLD AUTO: 3.56 X10(6)UL (ref 3.8–5.3)
WBC # BLD AUTO: 10.1 X10(3) UL (ref 4–11)

## 2025-04-17 PROCEDURE — 99214 OFFICE O/P EST MOD 30 MIN: CPT | Performed by: HOSPITALIST

## 2025-04-17 NOTE — PHYSICAL THERAPY NOTE
PHYSICAL THERAPY TREATMENT NOTE - INPATIENT     Room Number: 419/419-A       Presenting Problem:  (R VAHID)       Problem List  Principal Problem:    Primary osteoarthritis of right hip  Active Problems:    Hyperlipidemia    S/P hip replacement, right      PHYSICAL THERAPY ASSESSMENT   Patient demonstrates good  progress this session, goals  progressing with transfers and ambulation this session.       Patient is requiring supervision as a result of the following impairments: decreased functional strength, pain, and limited R hip ROM.     Patient continues to function below baseline with bed mobility, transfers, gait, standing prolonged periods, and performing household tasks.  Pt demos adequate safety and stability with functional mobility tasks and is safe to discharge from PT standpoint.  Physical Therapy will continue to follow patient for duration of hospitalization.    Patient continues to benefit from continued skilled PT services: at discharge to promote prior level of function and safety with additional support and return home with home health PT.    PLAN DURING HOSPITALIZATION  Nursing Mobility Recommendation : 1 Assist  PT Device Recommendation: Rolling walker  PT Treatment Plan: Bed mobility, Patient education, Family education, Gait training, Transfer training  Frequency (Obs): Daily     SUBJECTIVE  Agreeable to activity    OBJECTIVE  Precautions: Bed/chair alarm, VAHID - posterior        PAIN ASSESSMENT   Ratin  Location: R hip  Management Techniques: Activity promotion, Body mechanics    BALANCE  Static Sitting: Normal  Dynamic Sitting: Good  Static Standing: Fair +  Dynamic Standing: Fair         AM-PAC '6-Clicks' INPATIENT SHORT FORM - BASIC MOBILITY  How much difficulty does the patient currently have...  Patient Difficulty: Turning over in bed (including adjusting bedclothes, sheets and blankets)?: A Little   Patient Difficulty: Sitting down on and standing up from a chair with arms (e.g.,  wheelchair, bedside commode, etc.): A Little   Patient Difficulty: Moving from lying on back to sitting on the side of the bed?: A Little   How much help from another person does the patient currently need...   Help from Another: Moving to and from a bed to a chair (including a wheelchair)?: A Little   Help from Another: Need to walk in hospital room?: A Little   Help from Another: Climbing 3-5 steps with a railing?: A Little     AM-PAC Score:  Raw Score: 18   Approx Degree of Impairment: 46.58%   Standardized Score (AM-PAC Scale): 43.63   CMS Modifier (G-Code): CK    FUNCTIONAL ABILITY STATUS  Functional Mobility/Gait Assessment  Gait Assistance: Supervision  Distance (ft): 220 ft  Assistive Device: Rolling walker  Pattern: R Decreased stance time (slow pace, steady with no LOB, step through)  Sit to Stand: supervision to RW    Skilled Therapy Provided: Pt rec'd in chair agreeable to therapy, identified by name and , gait belt donned for mobility. Pt ed provided to review posterior hip precautions, able to maintain with intermittent cues during functional mobility tasks. Pt able to perform STS transfers and ambulate community distances with RW and supervision. Pt ed provided on importance of OOB mobility once home, use of ice, use of RW, HEP therex, and car transfers, all with good pt understanding.         The patient's Approx Degree of Impairment: 46.58% has been calculated based on documentation in the [facility] '6 clicks' Inpatient Daily Activity Short Form.  Research supports that patients with this level of impairment may benefit from HHPT.  Final disposition will be made by interdisciplinary medical team.    THERAPEUTIC EXERCISES  Lower Extremity Ankle pumps  Glut sets  Hip AB/AD  LAQ  Quad sets     Position Sitting       Patient End of Session: Up in chair, Needs met, Call light within reach, RN aware of session/findings    CURRENT GOALS   Goals to be met by:   Patient Goal Patient's self-stated goal is:  home   Goal #1 Patient is able to demonstrate supine - sit EOB @ level: modified independent      Goal #1   Current Status  in progress   Goal #2 Patient is able to demonstrate transfers Sit to/from Stand at assistance level: modified independent with walker - rolling      Goal #2  Current Status  in progress   Goal #3 Patient is able to ambulate 300 feet with assist device: walker - rolling at assistance level: modified independent   Goal #3   Current Status  in progress   Goal #4 Patient will negotiate 3 stairs/one curb w/ assistive device and supervision   Goal #4   Current Status  in progress   Goal #5 Patient to demonstrate independence with home activity/exercise instructions provided to patient in preparation for discharge.   Goal #5   Current Status  ongoing    Goal #6     Goal #6  Current Status       Gait Training: 15 minutes  Therapeutic Activity: 10 minutes

## 2025-04-17 NOTE — PLAN OF CARE
POD 2 of a right total hip arthroplasty. Patient is alert and oriented x4. On room air. ASA. SCD, and cem hose for DVT prophylaxis. Ambulating stand by with walker. WBAT. Scheduled tylenol for pain management.  Cleared by surgery for discharge home. Passed PT. IV removed. Discharge instructions provided at bedside. Escorted to private vehicle via wheelchair.     Problem: Patient Centered Care  Goal: Patient preferences are identified and integrated in the patient's plan of care  Description: Interventions:- Provide timely, complete, and accurate information to patient/family- Incorporate patient and family knowledge, values, beliefs, and cultural backgrounds into the planning and delivery of care- Encourage patient/family to participate in care and decision-making at the level they choose- Honor patient and family perspectives and choices  Outcome: Completed     Problem: PAIN - ADULT  Goal: Verbalizes/displays adequate comfort level or patient's stated pain goal  Description: INTERVENTIONS:- Encourage pt to monitor pain and request assistance- Assess pain using appropriate pain scale- Administer analgesics based on type and severity of pain and evaluate response- Implement non-pharmacological measures as appropriate and evaluate response- Consider cultural and social influences on pain and pain management- Manage/alleviate anxiety- Utilize distraction and/or relaxation techniques- Monitor for opioid side effects- Notify MD/LIP if interventions unsuccessful or patient reports new pain- Anticipate increased pain with activity and pre-medicate as appropriate  Outcome: Completed     Problem: SAFETY ADULT - FALL  Goal: Free from fall injury  Description: INTERVENTIONS:- Assess pt frequently for physical needs- Identify cognitive and physical deficits and behaviors that affect risk of falls.- [place] fall precautions as indicated by assessment.- Educate pt/family on patient safety including physical limitations-  Instruct pt to call for assistance with activity based on assessment- Modify environment to reduce risk of injury- Provide assistive devices as appropriate- Consider OT/PT consult to assist with strengthening/mobility- Encourage toileting schedule  Outcome: Completed     Problem: DISCHARGE PLANNING  Goal: Discharge to home or other facility with appropriate resources  Description: INTERVENTIONS:- Identify barriers to discharge w/pt and caregiver- Include patient/family/discharge partner in discharge planning- Arrange for needed discharge resources and transportation as appropriate- Identify discharge learning needs (meds, wound care, etc)- Arrange for interpreters to assist at discharge as needed- Consider post-discharge preferences of patient/family/discharge partner- Complete POLST form as appropriate- Assess patient's ability to be responsible for managing their own health- Refer to Case Management Department for coordinating discharge planning if the patient needs post-hospital services based on physician/LIP order or complex needs related to functional status, cognitive ability or social support system  Outcome: Completed     Problem: GENITOURINARY - ADULT  Goal: Absence of urinary retention  Description: INTERVENTIONS:- Assess patient’s ability to void and empty bladder- Monitor intake/output and perform bladder scan as needed- Follow urinary retention protocol/standard of care- Consider collaborating with pharmacy to review patient's medication profile- Implement strategies to promote bladder emptying  Outcome: Completed     Problem: METABOLIC/FLUID AND ELECTROLYTES - ADULT  Goal: Electrolytes maintained within normal limits  Description: INTERVENTIONS:- Monitor labs and rhythm and assess patient for signs and symptoms of electrolyte imbalances- Administer electrolyte replacement as ordered- Monitor response to electrolyte replacements, including rhythm and repeat lab results as appropriate- Fluid restriction  as ordered- Instruct patient on fluid and nutrition restrictions as appropriate  Outcome: Completed     Problem: SKIN/TISSUE INTEGRITY - ADULT  Goal: Skin integrity remains intact  Description: INTERVENTIONS- Assess and document risk factors for pressure ulcer development- Assess and document skin integrity- Monitor for areas of redness and/or skin breakdown- Initiate interventions, skin care algorithm/standards of care as needed  Outcome: Completed  Goal: Incision(s), wounds(s) or drain site(s) healing without S/S of infection  Description: INTERVENTIONS:- Assess and document risk factors for pressure ulcer development- Assess and document skin integrity- Assess and document dressing/incision, wound bed, drain sites and surrounding tissue- Implement wound care per orders- Initiate isolation precautions as appropriate- Initiate Pressure Ulcer prevention bundle as indicated  Outcome: Completed     Problem: MUSCULOSKELETAL - ADULT  Goal: Return mobility to safest level of function  Description: INTERVENTIONS:- Assess patient stability and activity tolerance for standing, transferring and ambulating w/ or w/o assistive devices- Assist with transfers and ambulation using safe patient handling equipment as needed- Ensure adequate protection for wounds/incisions during mobilization- Obtain PT/OT consults as needed- Advance activity as appropriate- Communicate ordered activity level and limitations with patient/family  Outcome: Completed  Goal: Maintain proper alignment of affected body part  Description: INTERVENTIONS:- Support and protect limb and body alignment per provider's orders- Instruct and reinforce with patient and family use of appropriate assistive device and precautions (e.g. spinal or hip dislocation precautions)  Outcome: Completed     Problem: Impaired Functional Mobility  Goal: Achieve highest/safest level of mobility/gait  Description: Interventions:- Assess patient's functional ability and stability-  Promote increasing activity/tolerance for mobility and gait- Educate and engage patient/family in tolerated activity level and precautions  Outcome: Completed     Problem: Impaired Activities of Daily Living  Goal: Achieve highest/safest level of independence in self care  Description: Interventions:- Assess ability and encourage patient to participate in ADLs to maximize function- Promote sitting position while performing ADLs such as feeding, grooming, and bathing- Educate and encourage patient/family in tolerated functional activity level and precautions during self-care  Outcome: Completed

## 2025-04-17 NOTE — OCCUPATIONAL THERAPY NOTE
OCCUPATIONAL THERAPY TREATMENT NOTE - INPATIENT        Room Number: 419/419-A     Presenting Problem: R VAHID; posterior approach; WBAT; posterior precautions    Problem List  Principal Problem:    Primary osteoarthritis of right hip  Active Problems:    Hyperlipidemia    S/P hip replacement, right      OCCUPATIONAL THERAPY ASSESSMENT   Patient demonstrates good  progress this session, goals progressing with 2/4 met this session.    Patient is requiring supervision as a result of the following impairments: to maintain hip precautions w/ functional tasks.    Patient continues to function below baseline with adls and functional mobility due to posterior precautions. Anticipate pt will be able to return to home w/ support. No further OT contact planned    PLAN DURING HOSPITALIZATION  OT Device Recommendations:  (hip kit)  OT Treatment Plan:  (pt to be discharged to home later today no further OT contact planned)     SUBJECTIVE  That was helpful. Thank you    OBJECTIVE  Precautions: Bed/chair alarm, VAHID - posterior    PAIN ASSESSMENT  Rating: -- (pt offering no c/o pain)  Location: R hip  Management Techniques: Activity promotion; Relaxation; Repositioning; Ice    ACTIVITY TOLERANCE  good    O2 SATURATIONS  Activity on room air    ACTIVITIES OF DAILY LIVING ASSESSMENT  AM-PAC ‘6-Clicks’ Inpatient Daily Activity Short Form  How much help from another person does the patient currently need…  -   Putting on and taking off regular lower body clothing?: A Little  -   Bathing (including washing, rinsing, drying)?: A Little  -   Toileting, which includes using toilet, bedpan or urinal? : A Little  -   Putting on and taking off regular upper body clothing?: None  -   Taking care of personal grooming such as brushing teeth?: None  -   Eating meals?: None    AM-PAC Score:  Score: 21  Approx Degree of Impairment: 32.79%  Standardized Score (AM-PAC Scale): 44.27  CMS Modifier (G-Code): CADY    FUNCTIONAL TRANSFER ASSESSMENT  Sit to  Stand: Edge of Bed  Edge of Bed: Contact Guard Assist  Toilet Transfer: Contact Guard Assist    BED MOBILITY  Rolling: Minimal Assist  Supine to Sit : Minimal Assist  Scooting: Min A    BALANCE ASSESSMENT  Static Sitting: Stand-by Assist  Static Standing: Contact Guard Assist    FUNCTIONAL ADL ASSESSMENT  Eating: independent  Grooming: independent  UB Dressing: independent  LB Dressing: supervision w/ ae  Toileting: mod i    Skilled Therapy Provided:  RN contacted prior to start of care. Treatment coordinated w/ PT. Pt agreeable to participation in therapy. Pt received up in chair, alert and oriented x 4. Posterior precautions reviewed w/ emphasis on adls and transfers including car. Pt verbalizing understanding of information as discussed. Pt currently performing transfers and ambulating in the [place] w/ rw and supervision. Dressing task initiated in preparation for discharge. Pt completed le dressing w/ ae and occasional vc to minimize hip flexion >90. Suggestion made for hip kit for home use and reference information provided.     At end of session pt returning to chair and left w/ all needs in reach. RN aware of pt's status and performance in therapy. No further OT contact planned      EDUCATION PROVIDED  Patient Education : DME Recommendations; Functional Transfer Techniques; Fall Prevention; Surgical Precautions (compensatory strategies)  Patient's Response to Education: Verbalized Understanding; Returned Demonstration    The patient's Approx Degree of Impairment: 32.79% has been calculated based on documentation in the [facility] '6 clicks' Inpatient Daily Activity Short Form.  Research supports that patients with this level of impairment may benefit from return to home.  Final disposition will be made by interdisciplinary medical team.    Patient End of Session: Up in chair, Needs met, Call light within reach, RN aware of session/findings, All patient questions and concerns addressed    OT Goals:   Patient will  complete LE dressing with SBA with LHAE PRN  Comment: Goal Met    Patient will complete toilet transfer with Mod I   Comment: Goal Met    Patient will complete self care task at sink level with Mod I    Comment:na    Patient will independently recall hip precautions  Comment:pt required occasional vc to maintain precautions w/ functional tasks         Goals  on:   Frequency:1-2 more sessions    Self-Care Home Management: 15 minutes  Therapeutic Activity: 10 minutes

## 2025-04-17 NOTE — DISCHARGE SUMMARY
[facility]  part of [facility]    Discharge Summary    Mary Oneill Patient Status:  Outpatient in a Bed    1940 MRN [id number]   Location [facility] 4W/SW/SE Attending Dee Garza MD   Hosp Day # 0 PCP Ravi Parks DO     Date of Admission: 4/15/2025      Date of Discharge: 25      Admitting Diagnosis: Degenerative joint disease right hip  S/P hip replacement, right    Hospital Discharge Diagnoses:  DJD rt hip    Lace+ Score: 28  59-90 High Risk  29-58 Medium Risk  0-28   Low Risk.    TCM Follow-Up Recommendation:  LACE < 29: Low Risk of readmission after discharge from the hospital; Still recommend for TCM follow-up.          Problem List: Problem List[1]      Physical Exam:     Gen: No acute distress  Pulm: Lungs clear, normal respiratory effort  CV: Heart with regular rate and rhythm  Abd: Abdomen soft, nontender, nondistended, bowel sounds present  Neuro: No acute focal deficits  MSK: moves extremities  Skin: Warm and dry  Psych: Normal affect  Ext: no c/c/e      History of Present Illness: Per Dr Wade    The patient is an 84-year-old  female with chronic right hip pain and underlying severe primary osteoarthritis, failed outpatient conservative medical management options, scheduled today for above-mentioned procedure by her orthopedic surgeon, Dr. Muñoz.     Hospital Course:     Dizziness  Hb stable   Bolus IVF  VSS  Monitor tonight - resolved        Right hip osteoarthritis  Status post right total hip arthroplasty  Physical therapy  Ortho following  Pain management  Fu as outpt     History of hyperlipidemia  Continue medications     Discharge Condition: Stable    Discharge Medications:      Discharge Medications        START taking these medications        Instructions Prescription details   aspirin 325 MG Tbec      Take 1 tablet (325 mg total) by mouth in the morning and 1 tablet (325 mg total) before bedtime.   Refills: 0      Naloxone HCl 4 MG/0.1ML Liqd      4 mg by Nasal route as needed. If patient remains unresponsive, repeat dose in other nostril 2-5 minutes after first dose.   Quantity: 1 kit  Refills: 0     oxyCODONE 5 MG Tabs      Take 1 tablet (5 mg total) by mouth every 4 (four) hours as needed.   Refills: 0     Sennosides 17.2 MG Tabs      Take 1 tablet (17.2 mg total) by mouth 2 (two) times daily.   Refills: 0     traMADol 50 MG Tabs  Commonly known as: Ultram      Take 1 tablet (50 mg total) by mouth every 8 (eight) hours.   Refills: 0            CHANGE how you take these medications        Instructions Prescription details   acetaminophen 500 MG Tabs  Commonly known as: Tylenol Extra Strength  What changed:   how much to take  when to take this  reasons to take this      Take 2 tablets (1,000 mg total) by mouth every 8 (eight) hours.   Refills: 0            CONTINUE taking these medications        Instructions Prescription details   cyanocobalamin 1000 MCG Tabs  Commonly known as: Vitamin B12      Take 1 tablet (1,000 mcg total) by mouth every other day.   Refills: 0     ezetimibe-simvastatin 10-40 MG Tabs  Commonly known as: Vytorin      Take 1 tablet by mouth every evening.   Refills: 0     gabapentin 100 MG Caps  Commonly known as: Neurontin      Take 1 capsule (100 mg total) by mouth every 8 (eight) hours.   Refills: 0     Meloxicam 15 MG Tabs      Take 1 tablet (15 mg total) by mouth daily.   Refills: 0     Pantoprazole Sodium 40 MG Pack      Take by mouth. Take while on aspirin therapy   Refills: 0     VITAMIN D-3 OR      Take 2,000 Int'l Units/day by mouth in the morning.   Refills: 0            STOP taking these medications      Advil 200 MG Tabs  Generic drug: ibuprofen                  Where to Get Your Medications        These medications were sent to [id number] - [place], [place] - [address], [phone], [phone] [address]  [id number]      Phone: [phone]   Naloxone HCl 4 MG/0.1ML Liqd             Dee Garza MD  4/17/2025  10:54 AM    Greater than 30 minutes spent on preparation and coordination of this discharge       [1]   Patient Active Problem List  Diagnosis    Primary osteoarthritis of right hip    Hyperlipidemia    S/P hip replacement, right

## 2025-04-17 NOTE — PLAN OF CARE
Patient AO x4. Up with 1 and RW. Voiding freely up to bathroom. Incision to (R) hip intact. POD 2. On RA. No vision issues per patient. Scheduled Tylenol for pain. On RA. SCDs and ASA for DVT prophylaxis. Call light within reach, education done on fall precautions. Plan for HH once medically cleared.     Problem: Patient Centered Care  Goal: Patient preferences are identified and integrated in the patient's plan of care  Description: Interventions:- What would you like us to know as we care for you? Home alone- Provide timely, complete, and accurate information to patient/family- Incorporate patient and family knowledge, values, beliefs, and cultural backgrounds into the planning and delivery of care- Encourage patient/family to participate in care and decision-making at the level they choose- Honor patient and family perspectives and choices  Outcome: Progressing     Problem: PAIN - ADULT  Goal: Verbalizes/displays adequate comfort level or patient's stated pain goal  Description: INTERVENTIONS:- Encourage pt to monitor pain and request assistance- Assess pain using appropriate pain scale- Administer analgesics based on type and severity of pain and evaluate response- Implement non-pharmacological measures as appropriate and evaluate response- Consider cultural and social influences on pain and pain management- Manage/alleviate anxiety- Utilize distraction and/or relaxation techniques- Monitor for opioid side effects- Notify MD/LIP if interventions unsuccessful or patient reports new pain- Anticipate increased pain with activity and pre-medicate as appropriate  Outcome: Progressing     Problem: SAFETY ADULT - FALL  Goal: Free from fall injury  Description: INTERVENTIONS:- Assess pt frequently for physical needs- Identify cognitive and physical deficits and behaviors that affect risk of falls.- [place] fall precautions as indicated by assessment.- Educate pt/family on patient safety including physical limitations-  Instruct pt to call for assistance with activity based on assessment- Modify environment to reduce risk of injury- Provide assistive devices as appropriate- Consider OT/PT consult to assist with strengthening/mobility- Encourage toileting schedule  Outcome: Progressing     Problem: DISCHARGE PLANNING  Goal: Discharge to home or other facility with appropriate resources  Description: INTERVENTIONS:- Identify barriers to discharge w/pt and caregiver- Include patient/family/discharge partner in discharge planning- Arrange for needed discharge resources and transportation as appropriate- Identify discharge learning needs (meds, wound care, etc)- Arrange for interpreters to assist at discharge as needed- Consider post-discharge preferences of patient/family/discharge partner- Complete POLST form as appropriate- Assess patient's ability to be responsible for managing their own health- Refer to Case Management Department for coordinating discharge planning if the patient needs post-hospital services based on physician/LIP order or complex needs related to functional status, cognitive ability or social support system  Outcome: Progressing     Problem: GENITOURINARY - ADULT  Goal: Absence of urinary retention  Description: INTERVENTIONS:- Assess patient’s ability to void and empty bladder- Monitor intake/output and perform bladder scan as needed- Follow urinary retention protocol/standard of care- Consider collaborating with pharmacy to review patient's medication profile- Implement strategies to promote bladder emptying  Outcome: Progressing     Problem: METABOLIC/FLUID AND ELECTROLYTES - ADULT  Goal: Electrolytes maintained within normal limits  Description: INTERVENTIONS:- Monitor labs and rhythm and assess patient for signs and symptoms of electrolyte imbalances- Administer electrolyte replacement as ordered- Monitor response to electrolyte replacements, including rhythm and repeat lab results as appropriate- Fluid  restriction as ordered- Instruct patient on fluid and nutrition restrictions as appropriate  Outcome: Progressing     Problem: SKIN/TISSUE INTEGRITY - ADULT  Goal: Skin integrity remains intact  Description: INTERVENTIONS- Assess and document risk factors for pressure ulcer development- Assess and document skin integrity- Monitor for areas of redness and/or skin breakdown- Initiate interventions, skin care algorithm/standards of care as needed  Outcome: Progressing  Goal: Incision(s), wounds(s) or drain site(s) healing without S/S of infection  Description: INTERVENTIONS:- Assess and document risk factors for pressure ulcer development- Assess and document skin integrity- Assess and document dressing/incision, wound bed, drain sites and surrounding tissue- Implement wound care per orders- Initiate isolation precautions as appropriate- Initiate Pressure Ulcer prevention bundle as indicated  Outcome: Progressing     Problem: MUSCULOSKELETAL - ADULT  Goal: Return mobility to safest level of function  Description: INTERVENTIONS:- Assess patient stability and activity tolerance for standing, transferring and ambulating w/ or w/o assistive devices- Assist with transfers and ambulation using safe patient handling equipment as needed- Ensure adequate protection for wounds/incisions during mobilization- Obtain PT/OT consults as needed- Advance activity as appropriate- Communicate ordered activity level and limitations with patient/family  Outcome: Progressing  Goal: Maintain proper alignment of affected body part  Description: INTERVENTIONS:- Support and protect limb and body alignment per provider's orders- Instruct and reinforce with patient and family use of appropriate assistive device and precautions (e.g. spinal or hip dislocation precautions)  Outcome: Progressing     Problem: Impaired Functional Mobility  Goal: Achieve highest/safest level of mobility/gait  Description: Interventions:- Assess patient's functional  ability and stability- Promote increasing activity/tolerance for mobility and gait- Educate and engage patient/family in tolerated activity level and precautions- Encourage attention to right side  Outcome: Progressing     Problem: Impaired Activities of Daily Living  Goal: Achieve highest/safest level of independence in self care  Description: Interventions:- Assess ability and encourage patient to participate in ADLs to maximize function- Promote sitting position while performing ADLs such as feeding, grooming, and bathing- Educate and encourage patient/family in tolerated functional activity level and precautions during self-care- Encourage patient to incorporate impaired side during daily activities to promote function  Outcome: Progressing     Problem: Patient/Family Goals  Goal: Patient/Family Long Term Goal  Description: Patient's Long Term Goal: Interventions:- - See additional Care Plan goals for specific interventions  Outcome: Progressing  Goal: Patient/Family Short Term Goal  Description: Patient's Short Term Goal: Interventions: - - See additional Care Plan goals for specific interventions  Outcome: Progressing

## 2025-04-17 NOTE — PROGRESS NOTES
4/16/2025  Admission date 4/15/2025      S: No events overnight. Had dizziness yesterday but now resolved. Patient doing well this AM and wants to go home. Denies any numbness/tingling, F/C/S, N/V/D, SOB, Chest Pain, Abdominal Pain. Pain well-controlled.     O:    Vitals:    04/17/25 0807   BP: 113/56   Pulse: 76   Resp: 18   Temp:        Data Review: {  Recent Results (from the past 24 hours)   CBC, Platelet; No Differential    Collection Time: 04/17/25  5:11 AM   Result Value Ref Range    WBC 10.1 4.0 - 11.0 x10(3) uL    RBC 3.56 (L) 3.80 - 5.30 x10(6)uL    HGB 10.8 (L) 12.0 - 16.0 g/dL    HCT 31.0 (L) 35.0 - 48.0 %    MCV 87.1 80.0 - 100.0 fL    MCH 30.3 26.0 - 34.0 pg    MCHC 34.8 31.0 - 37.0 g/dL    RDW 12.4 11.0 - 15.0 %    RDW-SD 39.8 35.1 - 46.3 fL    .0 150.0 - 450.0 10(3)uL   RAINBOW DRAW LIGHT GREEN    Collection Time: 04/17/25  5:32 AM   Result Value Ref Range    Hold Lt Green Auto Resulted        Gen:  A&O x 3, VSS, Afebrile    Abd: Soft Nontender    Lower Extremity:   -  Dressing clean, dry, intact  -  2+ Pedal pulses Bilaterally  -  Tibialis Anterior/Gastroc and Soleus/ Extensor Halluses Longus motor in tact  -  Sensory in tact in sural/Saphenous/tibial/superficial peroneal/deep peroneal nerve distributions  -  Calf soft/non-tender with no palpable cords        A/P: S/P VAHID 2 Days Post-Op RTHA    1. Weight Bearing: WBAT w/posterior hip precautions  2. Deep Venous thrombosis prophylaxis - Compression stocking/ASA  3. Continue Physical Therapy, Mobilize out of bed  4. Pain control- modified as needed  5. Discharge Planning- pending clearance from Physical Therapy/Social work services. Plan for discharge to home with home health once cleared by therapy and medicine, likely today    Ramakrishna Catalan MD  Adult Reconstruction Fellow  [facility]

## (undated) DEVICE — PACK CDS TOTAL HIP

## (undated) DEVICE — 3M™ COBAN™ NL STERILE NON-LATEX SELF-ADHERENT WRAP, 2084S, 4 IN X 5 YD (10 CM X 4,5 M), 18 ROLLS/CASE: Brand: 3M™ COBAN™

## (undated) DEVICE — DRAPE CAM FOR THA NAVIGATION SYS

## (undated) DEVICE — 1010 S-DRAPE TOWEL DRAPE 10/BX: Brand: STERI-DRAPE™

## (undated) DEVICE — FEE TECH INTELLIJOINT

## (undated) DEVICE — NEEDLE SPNL 18GA L3.5IN W/ QNCKE SHARPER BVL

## (undated) DEVICE — SUT VCRL 2-0 36IN CT-1 ABSRB UD L36MM 1/2 CIR

## (undated) DEVICE — SLIM BODY SKIN STAPLER: Brand: APPOSE ULC

## (undated) DEVICE — SHEET,DRAPE,70X100,STERILE: Brand: MEDLINE

## (undated) DEVICE — 60 ML SYRINGE LUER-LOCK TIP: Brand: MONOJECT

## (undated) DEVICE — SKIN PREP TRAY 4 COMPARTM TRAY: Brand: MEDLINE INDUSTRIES, INC.

## (undated) DEVICE — ISLAND DRESSING 4IN X 10IN: Brand: SILVERLON ANTIMICROBIAL WOUND DRESSING

## (undated) DEVICE — DRAPE,U/ SHT,SPLIT,PLAS,STERIL: Brand: MEDLINE

## (undated) DEVICE — BIT DRL 3.2X30MM HIP DISP FOR 2

## (undated) DEVICE — 3M™ TEGADERM™ TRANSPARENT FILM DRESSING FRAME STYLE, 1626W, 4 IN X 4-3/4 IN (10 CM X 12 CM), 50/CT 4CT/CASE: Brand: 3M™ TEGADERM™

## (undated) DEVICE — 3M™ IOBAN™ 2 ANTIMICROBIAL INCISE DRAPE 6650EZ: Brand: IOBAN™ 2

## (undated) DEVICE — APPLICATOR PREP 26ML CHG 2% ISO ALC 70%

## (undated) DEVICE — SCREW, FEMUR, G2: Brand: INTELLIJOINT HIP®

## (undated) DEVICE — GAMMEX® PI HYBRID SIZE 8.5, STERILE POWDER-FREE SURGICAL GLOVE, POLYISOPRENE AND NEOPRENE BLEND: Brand: GAMMEX

## (undated) DEVICE — 2T11 #2 PDO 36 X 36: Brand: 2T11 #2 PDO 36 X 36

## (undated) DEVICE — HOOD: Brand: FLYTE

## (undated) DEVICE — SCREW, PELVIC, G2, 136MM: Brand: INTELLIJOINT HIP®

## (undated) DEVICE — INTENDED USE FOR SURGICAL MARKING ON INTACT SKIN, ALSO PROVIDES A PERMANENT METHOD OF IDENTIFYING OBJECTS IN THE OPERATING ROOM: Brand: WRITESITE® PLUS MINI PREP RESISTANT MARKER

## (undated) DEVICE — 35 ML SYRINGE LUER-LOCK TIP: Brand: MONOJECT

## (undated) DEVICE — SOLUTION IRRIG 3000ML 0.9% NACL FLX CONT

## (undated) DEVICE — SUT VCRL 1-0 36IN CTX ABSRB UD L48MM 1/2 CIR

## (undated) DEVICE — TOWEL,OR,DSP,ST,BLUE,DLX,2/PK,40PK/CS: Brand: MEDLINE

## (undated) DEVICE — SUT ETHBND XL 2 30IN V-37 NABSRB GRN 40MM 1/2

## (undated) DEVICE — SPHERE FOR THA NAVIGATION SYS

## (undated) DEVICE — SOLUTION IRRIG 1000ML 0.9% NACL USP BTL

## (undated) DEVICE — SUT MCRYL 2-0 36IN CT-1 ABSRB UD L36MM TAPR P

## (undated) DEVICE — BLADE ELECTRODE: Brand: EDGE

## (undated) DEVICE — 40409 ABDUCTION PILLOW MEDIUM: Brand: 40409 ABDUCTION PILLOW MEDIUM

## (undated) DEVICE — SHEET,DRAPE,53X77,STERILE: Brand: MEDLINE

## (undated) DEVICE — ADHESIVE SKIN TOP FOR WND CLSR DERMBND ADV

## (undated) DEVICE — ZZ-CONVERTED-TO-522442- SPONGE 4X4 10PK

## (undated) DEVICE — FEMORAL DISC, ELLIPSE: Brand: INTELLIJOINT HIP®